# Patient Record
Sex: FEMALE | Race: BLACK OR AFRICAN AMERICAN | NOT HISPANIC OR LATINO | Employment: FULL TIME | URBAN - METROPOLITAN AREA
[De-identification: names, ages, dates, MRNs, and addresses within clinical notes are randomized per-mention and may not be internally consistent; named-entity substitution may affect disease eponyms.]

---

## 2017-03-15 ENCOUNTER — OFFICE VISIT (OUTPATIENT)
Dept: FAMILY MEDICINE | Facility: CLINIC | Age: 37
End: 2017-03-15
Payer: COMMERCIAL

## 2017-03-15 VITALS
DIASTOLIC BLOOD PRESSURE: 82 MMHG | RESPIRATION RATE: 16 BRPM | SYSTOLIC BLOOD PRESSURE: 120 MMHG | WEIGHT: 199.5 LBS | BODY MASS INDEX: 36.71 KG/M2 | HEART RATE: 86 BPM | HEIGHT: 62 IN | OXYGEN SATURATION: 99 % | TEMPERATURE: 98 F

## 2017-03-15 DIAGNOSIS — J30.2 SEASONAL ALLERGIC RHINITIS, UNSPECIFIED ALLERGIC RHINITIS TRIGGER: Primary | ICD-10-CM

## 2017-03-15 PROCEDURE — 1160F RVW MEDS BY RX/DR IN RCRD: CPT | Mod: S$GLB,,, | Performed by: FAMILY MEDICINE

## 2017-03-15 PROCEDURE — 99999 PR PBB SHADOW E&M-EST. PATIENT-LVL III: CPT | Mod: PBBFAC,,, | Performed by: FAMILY MEDICINE

## 2017-03-15 PROCEDURE — 99214 OFFICE O/P EST MOD 30 MIN: CPT | Mod: S$GLB,,, | Performed by: FAMILY MEDICINE

## 2017-03-15 RX ORDER — FLUTICASONE PROPIONATE 50 MCG
1 SPRAY, SUSPENSION (ML) NASAL DAILY
Qty: 16 G | Refills: 5 | Status: SHIPPED | OUTPATIENT
Start: 2017-03-15 | End: 2017-08-21 | Stop reason: SDUPTHER

## 2017-03-15 NOTE — PATIENT INSTRUCTIONS
Allergic Rhinitis  Allergic rhinitis is an allergic reaction that affects the nose, and often the eyes. Its often known as nasal allergies. Nasal allergies are often due to things in the environment that are breathed in. Depending what you are sensitive to, nasal allergies may occur only during certain seasons. Or they may occur year round. Common indoor allergens include house dust mites, mold, cockroaches, and pet dander. Outdoor allergens include pollen from trees, grasses, and weeds.   Symptoms include a drippy, stuffy, and itchy nose. They also include sneezing and red and itchy eyes. You may feel tired more often. Severe allergies may also affect your breathing and trigger a condition called asthma.   Tests can be done to see what allergens are affecting you. You may be referred to an allergy specialist for testing and further evaluation.  Home care  The healthcare provider may prescribe medicines to help relieve allergy symptoms.   Ask the provider for advice on how to avoid substances that you are allergic to. Below are a few tips for each type of allergen.  Pet dander:  · Do not have pets with fur and feathers.  · If you cannot avoid having a pet, keep it out of your bedroom and off upholstered furniture.  Pollen:  · When pollen counts are high, keep windows of your car and home closed. If possible, use an air conditioner instead.  · Wear a filter mask when mowing or doing yard work.  House dust mites:  · Wash bedding every week in warm water and detergent and dry on a hot setting.  · Cover the mattress, box spring, and pillows with allergy covers.   · If possible, sleep in a room with no carpet, curtains, or upholstered furniture.  Cockroaches:  · Store food in sealed containers.  · Remove garbage from the home promptly.  · Fix water leaks  Mold:  · Keep humidity low by using a dehumidifier or air conditioner. Keep the dehumidifier and air conditioner clean and free of mold.  · Clean moldy areas with  bleach and water.  In general:  · Vacuum once or twice a week. If possible, use a vacuum with a high-efficiency particulate air (HEPA) filter.  · Do not smoke. Avoid cigarette smoke. Cigarette smoke is an irritant that can make symptoms worse.  Follow-up care  Follow up as advised by the health care provider or our staff. If you were referred to an allergy specialist, make this appointment promptly.  When to seek medical advice  Call your healthcare provider right away if the following occur:  · Coughing or wheezing  · Fever greater than 100.4°F (38°C)  · Continuing symptoms, new symptoms, or worsening symptoms  Call 911 right away if you have:  · Trouble breathing  · Hives (raised red bumps)  · Severe swelling of the face or severe itching of the eyes or mouth  Date Last Reviewed: 4/26/2015  © 9621-5189 [employer]. [address] [place] [address], [place] [address]. All rights reserved. This information is not intended as a substitute for professional medical care. Always follow your healthcare professional's instructions.

## 2017-03-15 NOTE — MEDICAL/APP STUDENT
"Subjective:       Patient ID: Lizbeth Nguyen is a 37 y.o. female.    Chief Complaint: Sinus Problem (x1 week with sore throat, nasal congestion, cough, and post nasal ; otc benadryl)    HPI Comments: Ms Nguyen comes to the clinic today with a 1 week history of postnasal drip, congestion and rhinorrhea. Last night she developed sore throat. She also has sneezing "fits", itchy watery eyes, and scant mucus (yellow tinged) production. Her symptoms are worse in the morning. She has taken benadryl which helped with sleep.    She denies getting a flu shot this season.    Denies sick contacts.    Similar symptoms to     She has a pertinent history of environmental allergy.    Review of Systems   Constitutional: Negative for chills, fatigue and fever.   HENT: Positive for congestion, postnasal drip, rhinorrhea, sinus pressure, sneezing and sore throat. Negative for ear pain and hearing loss.    Eyes: Positive for discharge and itching.   Respiratory: Positive for cough.    Cardiovascular: Negative for chest pain.   Allergic/Immunologic: Positive for environmental allergies.   Neurological: Negative for headaches.       Objective:      Physical Exam   Constitutional: She is oriented to person, place, and time. She appears well-developed and well-nourished.   HENT:   Head: Normocephalic and atraumatic. Head is with right periorbital erythema and with left periorbital erythema.   Right Ear: Hearing normal. Tympanic membrane is injected. A middle ear effusion is present.   Left Ear: Hearing normal. A middle ear effusion is present.   Nose: Mucosal edema and rhinorrhea present.   Mouth/Throat: Posterior oropharyngeal erythema present.   Eyes: EOM are normal. Pupils are equal, round, and reactive to light. Right eye exhibits discharge. Left eye exhibits discharge.   Neck: Normal range of motion. Neck supple.   Cardiovascular: Normal rate, regular rhythm, normal heart sounds and intact distal pulses.    Pulmonary/Chest: " "Effort normal and breath sounds normal.   Musculoskeletal: Normal range of motion.   Neurological: She is alert and oriented to person, place, and time.   Skin: Skin is warm and dry.   Psychiatric: She has a normal mood and affect. Her behavior is normal.   Vitals reviewed.      Assessment:       1. Allergic rhinitis  Plan:       - Flonase 50mcg each nares, twice daily for allergic symptoms  - Zyrtec 10mg PO once daily x 2 weeks, then PRN for allergic symptoms      Pt has been given instructions populated from [employer] database and has verbalized understanding of the after visit summary and information contained wherein.    Follow up with a primary care provider. May go to ER for acute shortness of breath, lightheadedness, fever, or any other emergent complaints or changes in condition.    "This note will be shared with the patient"    The [employer] medical Dictation software was used during the dictation of portions or the entirety of this medical record.  Phonetic or grammatic errors may exist due to the use of this software. For clarification, refer to the author of the document.          "

## 2017-03-15 NOTE — MR AVS SNAPSHOT
[facility]  [address]  [place] [address]  Phone: [phone]  Fax: [phone]                  Lizbeth Nguyen   3/15/2017 8:40 AM   Office Visit    Description:  Female : 1980   Provider:  Clay Pedroza MD   Department:  [facility]           Reason for Visit     Sinus Problem           Diagnoses this Visit        Comments    Seasonal allergic rhinitis, unspecified allergic rhinitis trigger    -  Primary            To Do List           Goals (5 Years of Data)     None      Follow-Up and Disposition     Return if symptoms worsen or fail to improve.       These Medications        Disp Refills Start End    fluticasone (FLONASE) 50 mcg/actuation nasal spray 16 g 5 3/15/2017     1 spray by Each Nare route once daily. - Each Nare    Pharmacy: [facility] [id number]  [facility] [address] [place] AT [facility] #: [phone]         [facility] On Call     [facility] On Call Nurse Care Line -  Assistance  Registered nurses in the [facility] On Call Center provide clinical advisement, health education, appointment booking, and other advisory services.  Call for this free service at [phone].             Medications           Message regarding Medications     Verify the changes and/or additions to your medication regime listed below are the same as discussed with your clinician today.  If any of these changes or additions are incorrect, please notify your healthcare provider.        STOP taking these medications     loratadine (CLARITIN) 10 mg tablet Take 1 tablet (10 mg total) by mouth once daily.    loratadine (CLARITIN) 10 mg tablet Take 1 tablet (10 mg total) by mouth once daily.           Verify that the below list of medications is an accurate representation of the medications you are currently taking.  If none reported, the list may be blank. If incorrect, please contact your healthcare provider. Carry this list with  "you in case of emergency.           Current Medications     fluticasone (FLONASE) 50 mcg/actuation nasal spray 1 spray by Each Nare route once daily.    ibuprofen (ADVIL) 200 MG tablet Take 200 mg by mouth every 6 (six) hours as needed for Pain.           Clinical Reference Information           Your Vitals Were     BP Pulse Temp Resp Height Weight    120/82 (BP Location: Right arm, Patient Position: Sitting, BP Method: Manual) 86 98.2 °F (36.8 °C) (Oral) 16 5' 2" (1.575 m) 90.5 kg (199 lb 8.3 oz)    Last Period SpO2 BMI          02/19/2017 (Exact Date) 99% 36.49 kg/m2        Blood Pressure          Most Recent Value    BP  120/82      Allergies as of 3/15/2017     No Known Allergies      Immunizations Administered on Date of Encounter - 3/15/2017     None      Instructions      Allergic Rhinitis  Allergic rhinitis is an allergic reaction that affects the nose, and often the eyes. Its often known as nasal allergies. Nasal allergies are often due to things in the environment that are breathed in. Depending what you are sensitive to, nasal allergies may occur only during certain seasons. Or they may occur year round. Common indoor allergens include house dust mites, mold, cockroaches, and pet dander. Outdoor allergens include pollen from trees, grasses, and weeds.   Symptoms include a drippy, stuffy, and itchy nose. They also include sneezing and red and itchy eyes. You may feel tired more often. Severe allergies may also affect your breathing and trigger a condition called asthma.   Tests can be done to see what allergens are affecting you. You may be referred to an allergy specialist for testing and further evaluation.  Home care  The healthcare provider may prescribe medicines to help relieve allergy symptoms.   Ask the provider for advice on how to avoid substances that you are allergic to. Below are a few tips for each type of allergen.  Pet dander:  · Do not have pets with fur and feathers.  · If you cannot " avoid having a pet, keep it out of your bedroom and off upholstered furniture.  Pollen:  · When pollen counts are high, keep windows of your car and home closed. If possible, use an air conditioner instead.  · Wear a filter mask when mowing or doing yard work.  House dust mites:  · Wash bedding every week in warm water and detergent and dry on a hot setting.  · Cover the mattress, box spring, and pillows with allergy covers.   · If possible, sleep in a room with no carpet, curtains, or upholstered furniture.  Cockroaches:  · Store food in sealed containers.  · Remove garbage from the home promptly.  · Fix water leaks  Mold:  · Keep humidity low by using a dehumidifier or air conditioner. Keep the dehumidifier and air conditioner clean and free of mold.  · Clean moldy areas with bleach and water.  In general:  · Vacuum once or twice a week. If possible, use a vacuum with a high-efficiency particulate air (HEPA) filter.  · Do not smoke. Avoid cigarette smoke. Cigarette smoke is an irritant that can make symptoms worse.  Follow-up care  Follow up as advised by the health care provider or our staff. If you were referred to an allergy specialist, make this appointment promptly.  When to seek medical advice  Call your healthcare provider right away if the following occur:  · Coughing or wheezing  · Fever greater than 100.4°F (38°C)  · Continuing symptoms, new symptoms, or worsening symptoms  Call 911 right away if you have:  · Trouble breathing  · Hives (raised red bumps)  · Severe swelling of the face or severe itching of the eyes or mouth  Date Last Reviewed: 4/26/2015  © 2109-5376 [employer]. [address] [place] [address], [place] [address]. All rights reserved. This information is not intended as a substitute for professional medical care. Always follow your healthcare professional's instructions.             Language Assistance Services     ATTENTION: Language assistance services are available, free of  charge. Please call [phone].      ATENCIÓN: Si habla español, tiene a sanchez disposición servicios gratuitos de asistencia lingüística. Llame al [phone].     CHÚ Ý: N?u b?n nói Ti?ng Vi?t, có các d?ch v? h? tr? ngôn ng? mi?n phí dành cho b?n. G?i s? [phone].         [facility] complies with applicable Federal civil rights laws and does not discriminate on the basis of race, color, national origin, age, disability, or sex.

## 2017-03-15 NOTE — PROGRESS NOTES
Subjective:       Patient ID: Lizbeth Nguyen is a 37 y.o. female.    Chief Complaint: Sinus Problem (x1 week with sore throat, nasal congestion, cough, and post nasal ; otc benadryl)    Sinus Problem   This is a new problem. The current episode started in the past 7 days. The problem has been gradually worsening since onset. There has been no fever. Associated symptoms include congestion and coughing. Pertinent negatives include no chills. (PND) Past treatments include oral decongestants. The treatment provided no relief.     No sick contacts    Current Outpatient Prescriptions on File Prior to Visit   Medication Sig Dispense Refill    ibuprofen (ADVIL) 200 MG tablet Take 200 mg by mouth every 6 (six) hours as needed for Pain.      [DISCONTINUED] fluticasone (FLONASE) 50 mcg/actuation nasal spray 1 spray by Each Nare route once daily. 16 g 1    [DISCONTINUED] loratadine (CLARITIN) 10 mg tablet Take 1 tablet (10 mg total) by mouth once daily. 30 tablet 1    [DISCONTINUED] loratadine (CLARITIN) 10 mg tablet Take 1 tablet (10 mg total) by mouth once daily. 30 tablet 1     No current facility-administered medications on file prior to visit.        Review of Systems   Constitutional: Negative for chills and fever.   HENT: Positive for congestion.    Respiratory: Positive for cough.    Gastrointestinal: Negative for diarrhea, nausea and vomiting.       Objective:     Vitals:    03/15/17 0844   BP: 120/82   Pulse: 86   Resp: 16   Temp: 98.2 °F (36.8 °C)        Physical Exam   Constitutional: She is oriented to person, place, and time. She appears well-developed and well-nourished. No distress.   HENT:   Head: Normocephalic and atraumatic.   Right Ear: External ear normal. Tympanic membrane is not injected and not bulging. A middle ear effusion is present.   Left Ear: External ear normal. Tympanic membrane is not injected and not bulging.  No middle ear effusion.   Nose: Mucosal edema and rhinorrhea present. No  nose lacerations or nasal deformity.   Mouth/Throat: Posterior oropharyngeal erythema present. No oropharyngeal exudate, posterior oropharyngeal edema or tonsillar abscesses.   PND   Eyes: Conjunctivae and EOM are normal. Right eye exhibits no discharge. Left eye exhibits no discharge. No scleral icterus.   Neck: No tracheal deviation present. No thyromegaly present.   Cardiovascular: Normal rate and regular rhythm.  Exam reveals no gallop and no friction rub.    No murmur heard.  Pulmonary/Chest: Effort normal and breath sounds normal. No respiratory distress. She has no wheezes. She has no rales.   Lymphadenopathy:     She has no cervical adenopathy.   Neurological: She is oriented to person, place, and time.   Skin: She is not diaphoretic.   Psychiatric: She has a normal mood and affect.   Vitals reviewed.      Assessment:       1. Seasonal allergic rhinitis, unspecified allergic rhinitis trigger        Plan:       Lizbeth was seen today for sinus problem.    Diagnoses and all orders for this visit:    Seasonal allergic rhinitis, unspecified allergic rhinitis trigger  -     fluticasone (FLONASE) 50 mcg/actuation nasal spray; 1 spray by Each Nare route once daily.    NEW - Pts sxs and PE most coincide with AR. Will give pt a trial of flonase and daily zyrtec for relief of pts sxs. Pt asked to allow 2 weeks of consistent use before evaluating the efficacy of flonase. Pt to call back if sxs worsen or do not improve in 2 weeks.           Return if symptoms worsen or fail to improve.        Pt verbalized understanding and agreed with our plan.

## 2017-08-17 ENCOUNTER — OFFICE VISIT (OUTPATIENT)
Dept: FAMILY MEDICINE | Facility: CLINIC | Age: 37
End: 2017-08-17
Payer: COMMERCIAL

## 2017-08-17 VITALS
RESPIRATION RATE: 17 BRPM | HEIGHT: 62 IN | BODY MASS INDEX: 39.27 KG/M2 | DIASTOLIC BLOOD PRESSURE: 84 MMHG | WEIGHT: 213.38 LBS | HEART RATE: 98 BPM | OXYGEN SATURATION: 97 % | TEMPERATURE: 99 F | SYSTOLIC BLOOD PRESSURE: 104 MMHG

## 2017-08-17 DIAGNOSIS — K59.00 CONSTIPATION, UNSPECIFIED CONSTIPATION TYPE: Primary | ICD-10-CM

## 2017-08-17 PROCEDURE — 99999 PR PBB SHADOW E&M-EST. PATIENT-LVL III: CPT | Mod: PBBFAC,,, | Performed by: FAMILY MEDICINE

## 2017-08-17 PROCEDURE — 99214 OFFICE O/P EST MOD 30 MIN: CPT | Mod: S$GLB,,, | Performed by: FAMILY MEDICINE

## 2017-08-17 PROCEDURE — 3008F BODY MASS INDEX DOCD: CPT | Mod: S$GLB,,, | Performed by: FAMILY MEDICINE

## 2017-08-17 RX ORDER — LACTULOSE 10 G/15ML
20 SOLUTION ORAL DAILY PRN
Qty: 300 ML | Refills: 0 | Status: SHIPPED | OUTPATIENT
Start: 2017-08-17 | End: 2017-08-27

## 2017-08-17 NOTE — LETTER
August 17, 2017      Lizbeth Nguyen   [address] [facility] [address]             [facility] [address] [place] [address]  Phone: [phone]  Fax: [phone] Lizbeth Nguyen    Was treated here on 08/17/2017    May Return to work/school on 08/21/2017.    No Restrictions              Manuel Ibarra MD

## 2017-08-17 NOTE — PROGRESS NOTES
Chief Complaint   Patient presents with    Abdominal Pain     on lt side upper abdomen stared with pain on monday        HPI  Lizbeth Nguyen is a 37 y.o. female with multiple medical diagnoses as listed in the medical history and problem list that presents for upper abdomen.      Upper abdominal pain - mid epigastric, no BM 4 days, no fever, no nausea, no vomiting, no changes with food, pain with movement, no rash, meds: enema only with mild relief. No other meds.     Pt is known to me and was last seen by me on 12/22/2016.    PAST MEDICAL HISTORY:  History reviewed. No pertinent past medical history.    PAST SURGICAL HISTORY:  History reviewed. No pertinent surgical history.    SOCIAL HISTORY:  Social History     Social History    Marital status: Single     Spouse name: N/A    Number of children: N/A    Years of education: N/A     Occupational History    Not on file.     Social History Main Topics    Smoking status: Never Smoker    Smokeless tobacco: Not on file    Alcohol use Yes    Drug use: No    Sexual activity: Not on file     Other Topics Concern    Not on file     Social History Narrative    No narrative on file       FAMILY HISTORY:  Family History   Problem Relation Age of Onset    Hypertension Father        ALLERGIES AND MEDICATIONS: updated and reviewed.  Review of patient's allergies indicates:  No Known Allergies  Current Outpatient Prescriptions   Medication Sig Dispense Refill    fluticasone (FLONASE) 50 mcg/actuation nasal spray 1 spray by Each Nare route once daily. 16 g 5    ibuprofen (ADVIL) 200 MG tablet Take 200 mg by mouth every 6 (six) hours as needed for Pain.      lactulose (CHRONULAC) 20 gram/30 mL Soln Take 30 mLs (20 g total) by mouth daily as needed. 300 mL 0     No current facility-administered medications for this visit.        ROS  Review of Systems   Constitutional: Negative for activity change, appetite change and fever.   HENT: Negative for congestion and  "sore throat.    Eyes: Negative for visual disturbance.   Respiratory: Negative for cough and shortness of breath.    Cardiovascular: Negative for chest pain.   Gastrointestinal: Positive for abdominal pain. Negative for diarrhea, nausea and vomiting.   Endocrine: Negative.    Genitourinary: Negative for dysuria.   Musculoskeletal: Negative for arthralgias and back pain.   Skin: Negative for rash.   Allergic/Immunologic: Negative.    Neurological: Negative for dizziness, weakness and headaches.   Hematological: Negative.    Psychiatric/Behavioral: Negative for agitation and confusion.       Physical Exam  Vitals:    08/17/17 1525   BP: 104/84   Pulse: 98   Resp: 17   Temp: 98.7 °F (37.1 °C)    Body mass index is 39.03 kg/m².  Weight: 96.8 kg (213 lb 6.5 oz)   Height: 5' 2" (157.5 cm)     Physical Exam   Constitutional: She is oriented to person, place, and time. She appears well-developed and well-nourished.   HENT:   Head: Normocephalic and atraumatic.   Eyes: Conjunctivae and EOM are normal. Pupils are equal, round, and reactive to light.   Neck: Normal range of motion. Neck supple.   Cardiovascular: Normal rate, regular rhythm and normal heart sounds.    Pulmonary/Chest: Effort normal and breath sounds normal.   Abdominal: Soft. Bowel sounds are normal.   Musculoskeletal: Normal range of motion.   Mid epigatric TTP, no R/G   Neurological: She is alert and oriented to person, place, and time. She has normal reflexes.   Skin: Skin is warm and dry.   Psychiatric: She has a normal mood and affect. Her behavior is normal. Judgment and thought content normal.       Health Maintenance       Date Due Completion Date    TETANUS VACCINE 02/28/1999 ---    Pap Smear with HPV Cotest 08/14/2016 8/14/2013 (Done)    Override on 8/14/2013: Done    Influenza Vaccine 08/01/2017 ---          Assessment & Plan    Constipation, unspecified constipation type. 1st episode  - Increase hydration, fiber  - Lactulose prn prog worsening " constipation  - Encouraged exercise   - Monitor symptoms closely        Return in about 4 weeks (around 9/14/2017).

## 2017-08-18 ENCOUNTER — LAB VISIT (OUTPATIENT)
Dept: LAB | Facility: HOSPITAL | Age: 37
End: 2017-08-18
Attending: FAMILY MEDICINE
Payer: COMMERCIAL

## 2017-08-18 DIAGNOSIS — K59.00 CONSTIPATION, UNSPECIFIED CONSTIPATION TYPE: ICD-10-CM

## 2017-08-18 LAB
ALBUMIN SERPL BCP-MCNC: 3.4 G/DL
ALP SERPL-CCNC: 58 U/L
ALT SERPL W/O P-5'-P-CCNC: 16 U/L
ANION GAP SERPL CALC-SCNC: 8 MMOL/L
AST SERPL-CCNC: 19 U/L
BASOPHILS # BLD AUTO: 0.03 K/UL
BASOPHILS NFR BLD: 0.5 %
BILIRUB SERPL-MCNC: 0.5 MG/DL
BUN SERPL-MCNC: 13 MG/DL
CALCIUM SERPL-MCNC: 9.1 MG/DL
CHLORIDE SERPL-SCNC: 103 MMOL/L
CHOLEST/HDLC SERPL: 3.8 {RATIO}
CO2 SERPL-SCNC: 25 MMOL/L
CREAT SERPL-MCNC: 1 MG/DL
DIFFERENTIAL METHOD: NORMAL
EOSINOPHIL # BLD AUTO: 0.3 K/UL
EOSINOPHIL NFR BLD: 5.5 %
ERYTHROCYTE [DISTWIDTH] IN BLOOD BY AUTOMATED COUNT: 13.3 %
EST. GFR  (AFRICAN AMERICAN): >60 ML/MIN/1.73 M^2
EST. GFR  (NON AFRICAN AMERICAN): >60 ML/MIN/1.73 M^2
GLUCOSE SERPL-MCNC: 84 MG/DL
HCT VFR BLD AUTO: 38.6 %
HDL/CHOLESTEROL RATIO: 26.3 %
HDLC SERPL-MCNC: 167 MG/DL
HDLC SERPL-MCNC: 44 MG/DL
HGB BLD-MCNC: 13 G/DL
LDLC SERPL CALC-MCNC: 111 MG/DL
LYMPHOCYTES # BLD AUTO: 2.6 K/UL
LYMPHOCYTES NFR BLD: 43 %
MCH RBC QN AUTO: 30.4 PG
MCHC RBC AUTO-ENTMCNC: 33.7 G/DL
MCV RBC AUTO: 90 FL
MONOCYTES # BLD AUTO: 0.4 K/UL
MONOCYTES NFR BLD: 5.8 %
NEUTROPHILS # BLD AUTO: 2.7 K/UL
NEUTROPHILS NFR BLD: 45 %
NONHDLC SERPL-MCNC: 123 MG/DL
PLATELET # BLD AUTO: 253 K/UL
PMV BLD AUTO: 10.7 FL
POTASSIUM SERPL-SCNC: 4.7 MMOL/L
PROT SERPL-MCNC: 6.9 G/DL
RBC # BLD AUTO: 4.27 M/UL
SODIUM SERPL-SCNC: 136 MMOL/L
T4 FREE SERPL-MCNC: 1.17 NG/DL
TRIGL SERPL-MCNC: 60 MG/DL
TSH SERPL DL<=0.005 MIU/L-ACNC: 1.36 UIU/ML
WBC # BLD AUTO: 6 K/UL

## 2017-08-18 PROCEDURE — 85025 COMPLETE CBC W/AUTO DIFF WBC: CPT

## 2017-08-18 PROCEDURE — 84439 ASSAY OF FREE THYROXINE: CPT

## 2017-08-18 PROCEDURE — 80053 COMPREHEN METABOLIC PANEL: CPT

## 2017-08-18 PROCEDURE — 36415 COLL VENOUS BLD VENIPUNCTURE: CPT | Mod: PO

## 2017-08-18 PROCEDURE — 80061 LIPID PANEL: CPT

## 2017-08-18 PROCEDURE — 84443 ASSAY THYROID STIM HORMONE: CPT

## 2017-08-21 RX ORDER — FLUTICASONE PROPIONATE 50 MCG
1 SPRAY, SUSPENSION (ML) NASAL DAILY
Qty: 16 G | Refills: 5 | OUTPATIENT
Start: 2017-08-21 | End: 2021-04-16

## 2018-03-27 ENCOUNTER — OFFICE VISIT (OUTPATIENT)
Dept: FAMILY MEDICINE | Facility: CLINIC | Age: 38
End: 2018-03-27
Payer: COMMERCIAL

## 2018-03-27 VITALS
HEART RATE: 104 BPM | TEMPERATURE: 98 F | DIASTOLIC BLOOD PRESSURE: 80 MMHG | BODY MASS INDEX: 38.54 KG/M2 | HEIGHT: 62 IN | WEIGHT: 209.44 LBS | SYSTOLIC BLOOD PRESSURE: 108 MMHG | RESPIRATION RATE: 16 BRPM | OXYGEN SATURATION: 95 %

## 2018-03-27 DIAGNOSIS — K52.9 AGE (ACUTE GASTROENTERITIS): Primary | ICD-10-CM

## 2018-03-27 PROCEDURE — 99999 PR PBB SHADOW E&M-EST. PATIENT-LVL III: CPT | Mod: PBBFAC,,, | Performed by: FAMILY MEDICINE

## 2018-03-27 PROCEDURE — 99214 OFFICE O/P EST MOD 30 MIN: CPT | Mod: S$GLB,,, | Performed by: FAMILY MEDICINE

## 2018-03-27 RX ORDER — LOPERAMIDE HYDROCHLORIDE 2 MG/1
2 CAPSULE ORAL 3 TIMES DAILY PRN
Qty: 40 CAPSULE | Refills: 1 | Status: SHIPPED | OUTPATIENT
Start: 2018-03-27 | End: 2018-04-06

## 2018-03-27 RX ORDER — LEVOCETIRIZINE DIHYDROCHLORIDE 5 MG/1
5 TABLET, FILM COATED ORAL NIGHTLY
COMMUNITY
End: 2021-04-16 | Stop reason: ALTCHOICE

## 2018-03-27 NOTE — LETTER
March 27, 2018      [facility] [address]  [place] [address]  Phone: [phone]  Fax: [phone]       Patient: Lizbeth Nguyen   YOB: 1980  Date of Visit: 03/27/2018    To Whom It May Concern:    Aisha Nguyen  was at [facility] on 03/27/2018 although symptoms were present on 3/26/2018.    She may return to work/school on 3/28/2018 with no restrictions. If you have any questions or concerns, or if I can be of further assistance, please do not hesitate to contact me.    Sincerely,          Manuel Ibarra MD

## 2018-03-27 NOTE — PROGRESS NOTES
Chief Complaint   Patient presents with    Diarrhea     for 2 days       HPI  Lizbeth Nguyen is a 38 y.o. female with multiple medical diagnoses as listed in the medical history and problem list that presents for diarrhea.    Diarrhea - also with nausea/emesis 2 days ago, overall 2 day hx, +watery, hydrating well, +water/gatorade, slowly improved appetite.      LMP - 3/5/2018.    Pt is known to me and was last seen by me on 8/17/2017.    PAST MEDICAL HISTORY:  History reviewed. No pertinent past medical history.    PAST SURGICAL HISTORY:  History reviewed. No pertinent surgical history.    SOCIAL HISTORY:  Social History     Social History    Marital status: Single     Spouse name: N/A    Number of children: N/A    Years of education: N/A     Occupational History    Not on file.     Social History Main Topics    Smoking status: Never Smoker    Smokeless tobacco: Never Used    Alcohol use Yes    Drug use: No    Sexual activity: Not on file     Other Topics Concern    Not on file     Social History Narrative    No narrative on file       FAMILY HISTORY:  Family History   Problem Relation Age of Onset    Hypertension Father        ALLERGIES AND MEDICATIONS: updated and reviewed.  Review of patient's allergies indicates:  No Known Allergies  Current Outpatient Prescriptions   Medication Sig Dispense Refill    fluticasone (FLONASE) 50 mcg/actuation nasal spray 1 spray by Each Nare route once daily. 16 g 5    ibuprofen (ADVIL) 200 MG tablet Take 200 mg by mouth every 6 (six) hours as needed for Pain.      levocetirizine (XYZAL) 5 MG tablet Take 5 mg by mouth every evening.      loperamide (IMODIUM) 2 mg capsule Take 1 capsule (2 mg total) by mouth 3 (three) times daily as needed for Diarrhea. 40 capsule 1     No current facility-administered medications for this visit.        ROS  Review of Systems   Constitutional: Positive for fatigue. Negative for activity change, appetite change and fever.  "  HENT: Negative for congestion and sore throat.    Eyes: Negative for visual disturbance.   Respiratory: Negative for cough and shortness of breath.    Cardiovascular: Negative for chest pain.   Gastrointestinal: Positive for diarrhea, nausea and vomiting. Negative for abdominal pain.   Endocrine: Negative.    Genitourinary: Negative for dysuria.   Musculoskeletal: Negative for arthralgias and back pain.   Skin: Negative for rash.   Allergic/Immunologic: Negative.    Neurological: Negative for dizziness, weakness and headaches.   Hematological: Negative.    Psychiatric/Behavioral: Negative for agitation and confusion.       Physical Exam  Vitals:    03/27/18 1616   BP: 108/80   Pulse: 104   Resp: 16   Temp: 98.3 °F (36.8 °C)    Body mass index is 38.31 kg/m².  Weight: 95 kg (209 lb 7 oz)   Height: 5' 2" (157.5 cm)     Physical Exam   Constitutional: She is oriented to person, place, and time. She appears well-developed and well-nourished.   HENT:   Head: Normocephalic.   Neurological: She is alert and oriented to person, place, and time.   Psychiatric: She has a normal mood and affect. Her behavior is normal. Judgment and thought content normal.       Health Maintenance       Date Due Completion Date    Influenza Vaccine 08/01/2017 10/24/2014    Pap Smear with HPV Cotest 09/28/2020 9/28/2017    Override on 8/14/2013: Done    TETANUS VACCINE 10/14/2027 10/14/2017          Assessment & Plan    AGE (acute gastroenteritis)  -     loperamide (IMODIUM) 2 mg capsule; Take 1 capsule (2 mg total) by mouth 3 (three) times daily as needed for Diarrhea.  Dispense: 40 capsule; Refill: 1  - Overall improving  - Discussed dietary restrictions        Follow-up in about 4 weeks (around 4/24/2018).          "

## 2018-05-02 ENCOUNTER — OFFICE VISIT (OUTPATIENT)
Dept: FAMILY MEDICINE | Facility: CLINIC | Age: 38
End: 2018-05-02
Payer: COMMERCIAL

## 2018-05-02 VITALS
WEIGHT: 214.94 LBS | HEART RATE: 110 BPM | SYSTOLIC BLOOD PRESSURE: 124 MMHG | DIASTOLIC BLOOD PRESSURE: 70 MMHG | TEMPERATURE: 98 F | HEIGHT: 62 IN | RESPIRATION RATE: 20 BRPM | OXYGEN SATURATION: 96 % | BODY MASS INDEX: 39.56 KG/M2

## 2018-05-02 DIAGNOSIS — R31.9 HEMATURIA, UNSPECIFIED TYPE: Primary | ICD-10-CM

## 2018-05-02 DIAGNOSIS — R10.2 ACUTE PELVIC PAIN, FEMALE: ICD-10-CM

## 2018-05-02 DIAGNOSIS — G89.29 CHRONIC BILATERAL LOW BACK PAIN, WITH SCIATICA PRESENCE UNSPECIFIED: ICD-10-CM

## 2018-05-02 DIAGNOSIS — M54.5 CHRONIC BILATERAL LOW BACK PAIN, WITH SCIATICA PRESENCE UNSPECIFIED: ICD-10-CM

## 2018-05-02 DIAGNOSIS — N39.0 URINARY TRACT INFECTION WITHOUT HEMATURIA, SITE UNSPECIFIED: ICD-10-CM

## 2018-05-02 LAB
BILIRUB SERPL-MCNC: ABNORMAL MG/DL
BLOOD URINE, POC: ABNORMAL
COLOR, POC UA: ABNORMAL
GLUCOSE UR QL STRIP: NORMAL
KETONES UR QL STRIP: NEGATIVE
LEUKOCYTE ESTERASE URINE, POC: ABNORMAL
NITRITE, POC UA: NEGATIVE
PH, POC UA: 7
PROTEIN, POC: ABNORMAL
SPECIFIC GRAVITY, POC UA: 1.01
UROBILINOGEN, POC UA: NORMAL

## 2018-05-02 PROCEDURE — 99999 PR PBB SHADOW E&M-EST. PATIENT-LVL III: CPT | Mod: PBBFAC,,, | Performed by: FAMILY MEDICINE

## 2018-05-02 PROCEDURE — 81001 URINALYSIS AUTO W/SCOPE: CPT | Mod: S$GLB,,, | Performed by: FAMILY MEDICINE

## 2018-05-02 PROCEDURE — 87086 URINE CULTURE/COLONY COUNT: CPT

## 2018-05-02 PROCEDURE — 99214 OFFICE O/P EST MOD 30 MIN: CPT | Mod: 25,S$GLB,, | Performed by: FAMILY MEDICINE

## 2018-05-02 RX ORDER — CIPROFLOXACIN 500 MG/1
500 TABLET ORAL 2 TIMES DAILY
Qty: 14 TABLET | Refills: 0 | Status: SHIPPED | OUTPATIENT
Start: 2018-05-02 | End: 2018-05-09

## 2018-05-02 NOTE — PROGRESS NOTES
Chief Complaint   Patient presents with    Hematuria    Pelvic Pain       HPI  Lizbeth Nguyen is a 38 y.o. female with multiple medical diagnoses as listed in the medical history and problem list that presents for pelvic pain.    Pelvic pain, hematuria - 1 week hx, after menses completion states hematuria 2 days afterward, +inguinal pain, +malodorous urine, increased hydration, +hematuria resolved. Overall symptoms have improved.      Chronic back pain - presented to Ortho a few years ago, recommended PT, unable to complete.     Pt is known to me and was last seen by me on 3/27/2018.    PAST MEDICAL HISTORY:  History reviewed. No pertinent past medical history.    PAST SURGICAL HISTORY:  History reviewed. No pertinent surgical history.    SOCIAL HISTORY:  Social History     Social History    Marital status: Single     Spouse name: N/A    Number of children: N/A    Years of education: N/A     Occupational History    Not on file.     Social History Main Topics    Smoking status: Never Smoker    Smokeless tobacco: Never Used    Alcohol use Yes    Drug use: No    Sexual activity: Not on file     Other Topics Concern    Not on file     Social History Narrative    No narrative on file       FAMILY HISTORY:  Family History   Problem Relation Age of Onset    Hypertension Father        ALLERGIES AND MEDICATIONS: updated and reviewed.  Review of patient's allergies indicates:  No Known Allergies  Current Outpatient Prescriptions   Medication Sig Dispense Refill    fluticasone (FLONASE) 50 mcg/actuation nasal spray 1 spray by Each Nare route once daily. 16 g 5    ibuprofen (ADVIL) 200 MG tablet Take 200 mg by mouth every 6 (six) hours as needed for Pain.      levocetirizine (XYZAL) 5 MG tablet Take 5 mg by mouth every evening.       No current facility-administered medications for this visit.        ROS  Review of Systems   Constitutional: Negative for activity change, appetite change and fever.   HENT:  "Negative for congestion and sore throat.    Eyes: Negative for visual disturbance.   Respiratory: Negative for cough and shortness of breath.    Cardiovascular: Negative for chest pain.   Gastrointestinal: Negative for abdominal pain, diarrhea, nausea and vomiting.   Endocrine: Negative.    Genitourinary: Positive for hematuria. Negative for dysuria.   Musculoskeletal: Negative for arthralgias and back pain.   Skin: Negative for rash.   Allergic/Immunologic: Negative.    Neurological: Negative for dizziness, weakness and headaches.   Hematological: Negative.    Psychiatric/Behavioral: Negative for agitation and confusion.       Physical Exam  Vitals:    05/02/18 1125   BP: 124/70   Pulse: 110   Resp: 20   Temp: 98.2 °F (36.8 °C)    Body mass index is 39.31 kg/m².  Weight: 97.5 kg (214 lb 15.2 oz)   Height: 5' 2" (157.5 cm)     Physical Exam   Constitutional: She is oriented to person, place, and time. She appears well-developed and well-nourished.   HENT:   Head: Normocephalic.   Neurological: She is alert and oriented to person, place, and time.   Psychiatric: She has a normal mood and affect. Her behavior is normal. Judgment and thought content normal.       Health Maintenance       Date Due Completion Date    Influenza Vaccine 08/01/2018 10/3/2017 (Done)    Override on 10/3/2017: Done    Pap Smear with HPV Cotest 09/28/2020 9/28/2017    Override on 8/14/2013: Done    TETANUS VACCINE 10/14/2027 10/14/2017          Assessment & Plan    Hematuria, unspecified type  -     POCT URINE DIPSTICK WITH MICROSCOPE, AUTOMATED    Acute pelvic pain, female  -     POCT URINE DIPSTICK WITH MICROSCOPE, AUTOMATED    Chronic bilateral low back pain, with sciatica presence unspecified  -     Ambulatory Referral to Physical/Occupational Therapy        No Follow-up on file.          "

## 2018-05-02 NOTE — LETTER
May 2, 2018      [facility] [address]  [place] [address]  Phone: [phone]  Fax: [phone]       Patient: Lizbeth Nguyen   YOB: 1980  Date of Visit: 05/02/2018    To Whom It May Concern:    Aisha Nguyen  was at [facility] on 05/02/2018. She may return to work/school on 5/2/2018 without restrictions. If you have any questions or concerns, or if I can be of further assistance, please do not hesitate to contact me.    Sincerely,          Manuel Ibarra MD

## 2018-05-04 LAB
BACTERIA UR CULT: NORMAL
BACTERIA UR CULT: NORMAL

## 2018-05-15 ENCOUNTER — CLINICAL SUPPORT (OUTPATIENT)
Dept: REHABILITATION | Facility: HOSPITAL | Age: 38
End: 2018-05-15
Attending: FAMILY MEDICINE
Payer: COMMERCIAL

## 2018-05-15 DIAGNOSIS — G89.29 CHRONIC RIGHT-SIDED LOW BACK PAIN WITHOUT SCIATICA: ICD-10-CM

## 2018-05-15 DIAGNOSIS — M54.50 CHRONIC RIGHT-SIDED LOW BACK PAIN WITHOUT SCIATICA: ICD-10-CM

## 2018-05-15 DIAGNOSIS — M62.81 WEAKNESS OF TRUNK MUSCULATURE: ICD-10-CM

## 2018-05-15 PROCEDURE — 97110 THERAPEUTIC EXERCISES: CPT | Mod: PN

## 2018-05-15 PROCEDURE — 97161 PT EVAL LOW COMPLEX 20 MIN: CPT | Mod: PN
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
## 2018-05-22 ENCOUNTER — CLINICAL SUPPORT (OUTPATIENT)
Dept: REHABILITATION | Facility: HOSPITAL | Age: 38
End: 2018-05-22
Attending: FAMILY MEDICINE
Payer: COMMERCIAL

## 2018-05-22 DIAGNOSIS — M54.50 CHRONIC RIGHT-SIDED LOW BACK PAIN WITHOUT SCIATICA: Primary | ICD-10-CM

## 2018-05-22 DIAGNOSIS — G89.29 CHRONIC RIGHT-SIDED LOW BACK PAIN WITHOUT SCIATICA: Primary | ICD-10-CM

## 2018-05-22 DIAGNOSIS — M62.81 WEAKNESS OF TRUNK MUSCULATURE: ICD-10-CM

## 2018-05-22 PROCEDURE — 97110 THERAPEUTIC EXERCISES: CPT | Mod: PN

## 2018-05-22 NOTE — PROGRESS NOTES
Physical Therapy Daily Note     Name: Lizbeth Nguyen  Clinic Number: [id number]  Diagnosis:   Encounter Diagnoses   Name Primary?    Chronic right-sided low back pain without sciatica Yes    Weakness of trunk musculature      Physician: Manuel Ibarra MD  Precautions: standard   Visit #: 2 of 20  PTA Visit #: 1    Time In: 1330  Time Out: 1435  Total Treatment Time: 65 minutes (1:1 with PTA 40 minutes of treatment session)     Subjective     Pt reports: Lizbeth states she felt her lower back was more sore following her initial evaluation. Patient states she isn't sure how her HEP is going because she hasn't had time to do it yet. Patient states the right side of her lower back is sore today.   Pain Scale: Lizbeth rates pain on a scale of 0-10 to be 2 currently.    Objective     Lizbeth received individual therapeutic exercises to develop strength, endurance, ROM, flexibility, posture and core stabilization for 45 minutes including:  Nu step x 7 minutes   SKTC 10 sec x 5   Supine piriformis stretch 20 sec x 3   Supine hip flexor stretch 20 sec x 3   LTR on physioball x 2 minutes  Supine hip adduction ball squeeze x 2 minutes   Posterior pelvic tilt x 2 minutes, 3 sec hold  Supine bridging 2x10  SL clamshells 2x10 ea (RTB)  Open book 1x10 ea   Seated ball roll outs x 3 directions x 3 minutes     Lizbeth received the following manual therapy techniques: Soft tissue Mobilization were applied to the: right hip for 10 minutes including:  IASTM with rolling stick to right QL, glut and IT band    Lizbeth received moist heat to lumbar spine x 10 minutes post treatment session. (supine, bolster under knees)    Written Home Exercises Provided: Reviewed HEP provided during initial evaluation.   Pt demo good understanding of the education provided. Lizbeth demonstrated good return demonstration of activities.     Education provided re:Lizbeth verbalized good  understanding of education provided.   No spiritual or educational barriers to learning provided    Assessment     Lizbeth tolerated treatment well today. Patient tender with palpation to right QL, glut, and IT band with good response to IASTM with rolling pin noted. Patient demonstrates good tolerance to initiation of hip stabilization exercises today with appropriate training effect achieved. Patient with very good response to skilled care with no complaints of lower back pain noted during today's treatment session.   This is a 38 y.o. female referred to outpatient physical therapy and presents with a medical diagnosis of lower back pain and demonstrates limitations as described in the problem list. Pt prognosis is Good. Pt will continue to benefit from skilled outpatient physical therapy to address the deficits listed in the problem list, provide pt/family education and to maximize pt's level of independence in the home and community environment.     Goals as follows:  GOALS: Short Term Goals:  2-3 weeks  1. Report decreased in pain at worse less than  <   / =  1 /10  to increase tolerance for functional activities  2. Pt to increase B Ely's Test by greater than  > or = 5 degrees in order to improve flexibility and posture.   3. Pt to tolerate HEP to improve ROM and independence with ADL's      Long Term Goals: 6-8 weeks  1. Report decreased in pain at worse less than  <   / =  0  /10  to increase tolerance for functional mobility.   2. Pt will report at 40% or less limitation on FOTO lumbar spine survey  to demonstrate decrease in disability and improvement in back pain.  3. Pt to be Independent with HEP to improve ROM and independence with ADL's  4. Pt to increase B Ely's Test by greater than > or = 10 degrees in order to improve flexibility and posture.   5. Pt to demonstrate negative Bridge Test in order to show improved core strength for lumbar stabilization     Plan     Continue with established Plan of  Care towards PT goals.    Therapist: Lizz Schwarz, PTA  5/22/2018

## 2018-05-25 ENCOUNTER — CLINICAL SUPPORT (OUTPATIENT)
Dept: REHABILITATION | Facility: HOSPITAL | Age: 38
End: 2018-05-25
Attending: FAMILY MEDICINE
Payer: COMMERCIAL

## 2018-05-25 DIAGNOSIS — M62.81 WEAKNESS OF TRUNK MUSCULATURE: ICD-10-CM

## 2018-05-25 DIAGNOSIS — M54.50 CHRONIC RIGHT-SIDED LOW BACK PAIN WITHOUT SCIATICA: Primary | ICD-10-CM

## 2018-05-25 DIAGNOSIS — G89.29 CHRONIC RIGHT-SIDED LOW BACK PAIN WITHOUT SCIATICA: Primary | ICD-10-CM

## 2018-05-25 PROCEDURE — 97110 THERAPEUTIC EXERCISES: CPT | Mod: PN

## 2018-05-25 NOTE — PROGRESS NOTES
Physical Therapy Daily Note     Name: Lizbeth Nguyen  Clinic Number: [id number]  Diagnosis:   Encounter Diagnoses   Name Primary?    Chronic right-sided low back pain without sciatica Yes    Weakness of trunk musculature      Physician: Manuel Ibarra MD  Precautions: standard   Visit #: 3 of 20  PTA Visit #: 2    Time In: 1530  Time Out: 1600  Total Treatment Time: 30 minutes (1:1 with PTA 30 minutes of treatment session)     Subjective     Pt reports: Lizbeth states she doesn't have much pain. Patient describes it more as a discomfort in her right low back. Patient is still non-compliant with HEP.  Pain Scale: Lizbeth rates pain on a scale of 0-10 to be 4 currently.    Objective     Lizbeth received individual therapeutic exercises to develop strength, endurance, ROM, flexibility, posture and core stabilization for 45 minutes including:    Nu step x 7 minutes (not performed today)    SKTC 10 sec x 5   Supine piriformis stretch 20 sec x 3   Supine hip flexor stretch 20 sec x 3   LTR on physioball x 2 minutes  Supine hip adduction ball squeeze x 2 minutes   Posterior pelvic tilt x 2 minutes, 3 sec hold  Supine bridging 2x10  SL clamshells 2x10 ea (RTB)  Open book 1x10 ea   Seated ball roll outs x 3 directions x 3 minutes     Lizbeth received the following manual therapy techniques: Soft tissue Mobilization were applied to the: right hip for 00 minutes including:  IASTM with rolling stick to right QL, glut and IT band    Lizbeth received moist heat to lumbar spine x 00 minutes post treatment session. (supine, bolster under knees)    Written Home Exercises Provided: Reviewed HEP provided during initial evaluation.   Pt demo good understanding of the education provided. Lizbeth demonstrated good return demonstration of activities.     Education provided re:Lizbeth verbalized good understanding of education provided.   No spiritual or educational barriers to  learning provided    Assessment     Lizbeth tolerated treatment well today. Session shortened today due to patient arriving late. Patient demonstrates good tolerance to gluteal/core strengthening exercises today with appropriate training effect achieved. Patient with good response to exercises and demonstrates no provocation of lower back pain post session.   This is a 38 y.o. female referred to outpatient physical therapy and presents with a medical diagnosis of lower back pain and demonstrates limitations as described in the problem list. Pt prognosis is Good. Pt will continue to benefit from skilled outpatient physical therapy to address the deficits listed in the problem list, provide pt/family education and to maximize pt's level of independence in the home and community environment.     Goals as follows:  GOALS: Short Term Goals:  2-3 weeks  1. Report decreased in pain at worse less than  <   / =  1 /10  to increase tolerance for functional activities  2. Pt to increase B Ely's Test by greater than  > or = 5 degrees in order to improve flexibility and posture.   3. Pt to tolerate HEP to improve ROM and independence with ADL's      Long Term Goals: 6-8 weeks  1. Report decreased in pain at worse less than  <   / =  0  /10  to increase tolerance for functional mobility.   2. Pt will report at 40% or less limitation on FOTO lumbar spine survey  to demonstrate decrease in disability and improvement in back pain.  3. Pt to be Independent with HEP to improve ROM and independence with ADL's  4. Pt to increase B Ely's Test by greater than > or = 10 degrees in order to improve flexibility and posture.   5. Pt to demonstrate negative Bridge Test in order to show improved core strength for lumbar stabilization     Plan     Continue with established Plan of Care towards PT goals.    Therapist: Lizz Schwarz, PTA  5/25/2018

## 2018-05-29 ENCOUNTER — CLINICAL SUPPORT (OUTPATIENT)
Dept: REHABILITATION | Facility: HOSPITAL | Age: 38
End: 2018-05-29
Attending: FAMILY MEDICINE
Payer: COMMERCIAL

## 2018-05-29 DIAGNOSIS — G89.29 CHRONIC RIGHT-SIDED LOW BACK PAIN WITHOUT SCIATICA: Primary | ICD-10-CM

## 2018-05-29 DIAGNOSIS — M62.81 WEAKNESS OF TRUNK MUSCULATURE: ICD-10-CM

## 2018-05-29 DIAGNOSIS — M54.50 CHRONIC RIGHT-SIDED LOW BACK PAIN WITHOUT SCIATICA: Primary | ICD-10-CM

## 2018-05-29 PROCEDURE — 97110 THERAPEUTIC EXERCISES: CPT | Mod: PN

## 2018-05-29 NOTE — PROGRESS NOTES
Physical Therapy Daily Note     Name: Lizbeth Nguyen  Clinic Number: [id number]  Diagnosis:   Encounter Diagnoses   Name Primary?    Chronic right-sided low back pain without sciatica Yes    Weakness of trunk musculature      Physician: Manuel Ibarra MD  Precautions: standard   Visit #: 4 of 20  PTA Visit #: 3    Time In: 1540  Time Out: 1635  Total Treatment Time: 55 minutes (1:1 with PTA 30 minutes of treatment session)     Subjective     Pt reports: Lizbeth states she has just enough lower back pain to aggravate her. Patient states she does feel better after therapy sessions.   Pain Scale: Lizbeth rates pain on a scale of 0-10 to be 3 currently.    Objective     Lizbeth received individual therapeutic exercises to develop strength, endurance, ROM, flexibility, posture and core stabilization for 45 minutes including:    Upright bike x 7 minutes     SKTC 10 sec x 5   Supine piriformis stretch 20 sec x 3   Supine hip flexor stretch 20 sec x 3   LTR on physioball x 2 minutes  Supine hip adduction ball squeeze x 2 minutes   Posterior pelvic tilt x 2 minutes, 3 sec hold  Supine bridging 2x10  SL clamshells 2x10 ea (GTB)  Open book 1x10 ea   +Prone hip extension 1x10 ea   +BLE shuttle squats 3x10 2 black cords  +Standing shoulder extensions (GTB) 2x10  Seated ball roll outs x 3 directions x 3 minutes     Lizbeth received the following manual therapy techniques: Soft tissue Mobilization were applied to the: right hip for 00 minutes including:  IASTM with rolling stick to right QL, glut and IT band    Lizbeth received ice to lumbar spine x 10 minutes post treatment session. (supine, bolster under knees)    Written Home Exercises Provided: Reviewed HEP provided during initial evaluation.   Pt demo good understanding of the education provided. Lizbeth demonstrated good return demonstration of activities.     Education provided re:Lizbeth verbalized good  understanding of education provided.   No spiritual or educational barriers to learning provided    Assessment     Lizbeth tolerated treatment well today. Patient demonstrates very good tolerance to progression of therapeutic exercise with no complaints of lower back pain noted. Patient demonstrates good gluteal activation and she is able to maintain with prone hip extension. Patient demonstrates improvements in hip flexor flexibility with appropriate therapeutic effect achieved with mobility focused exercises.   This is a 38 y.o. female referred to outpatient physical therapy and presents with a medical diagnosis of lower back pain and demonstrates limitations as described in the problem list. Pt prognosis is Good. Pt will continue to benefit from skilled outpatient physical therapy to address the deficits listed in the problem list, provide pt/family education and to maximize pt's level of independence in the home and community environment.     Goals as follows:  GOALS: Short Term Goals:  2-3 weeks  1. Report decreased in pain at worse less than  <   / =  1 /10  to increase tolerance for functional activities  2. Pt to increase B Ely's Test by greater than  > or = 5 degrees in order to improve flexibility and posture.   3. Pt to tolerate HEP to improve ROM and independence with ADL's      Long Term Goals: 6-8 weeks  1. Report decreased in pain at worse less than  <   / =  0  /10  to increase tolerance for functional mobility.   2. Pt will report at 40% or less limitation on FOTO lumbar spine survey  to demonstrate decrease in disability and improvement in back pain.  3. Pt to be Independent with HEP to improve ROM and independence with ADL's  4. Pt to increase B Ely's Test by greater than > or = 10 degrees in order to improve flexibility and posture.   5. Pt to demonstrate negative Bridge Test in order to show improved core strength for lumbar stabilization     Plan     Continue with established Plan of Care  towards PT goals.    Therapist: Lizz Schwarz, PTA  5/29/2018

## 2018-06-01 ENCOUNTER — CLINICAL SUPPORT (OUTPATIENT)
Dept: REHABILITATION | Facility: HOSPITAL | Age: 38
End: 2018-06-01
Attending: FAMILY MEDICINE
Payer: COMMERCIAL

## 2018-06-01 DIAGNOSIS — M62.81 WEAKNESS OF TRUNK MUSCULATURE: ICD-10-CM

## 2018-06-01 DIAGNOSIS — G89.29 CHRONIC RIGHT-SIDED LOW BACK PAIN WITHOUT SCIATICA: ICD-10-CM

## 2018-06-01 DIAGNOSIS — M54.50 CHRONIC RIGHT-SIDED LOW BACK PAIN WITHOUT SCIATICA: ICD-10-CM

## 2018-06-01 PROCEDURE — 97110 THERAPEUTIC EXERCISES: CPT | Mod: PN

## 2018-06-01 NOTE — PROGRESS NOTES
Physical Therapy Daily Note     Name: Lizbeth Nguyen  Clinic Number: [id number]  Diagnosis:   Encounter Diagnoses   Name Primary?    Chronic right-sided low back pain without sciatica     Weakness of trunk musculature      Physician: Manuel Ibarra MD  Precautions: standard   Visit #: 5 of 20  PTA Visit #: 4    Time In: 1510  Time Out: 1600  Total Treatment Time: 50 minutes (1:1 with PTA 40 minutes of treatment session)    Subjective     Pt reports: Lizbeth states she is a little more sore than usual today. Patient states the right side of her back is bugging her today. Patient states she was a little more sore after last treatment session.   Pain Scale: Lizbeth rates pain on a scale of 0-10 to be 5 currently.    Objective     Lizbeth received individual therapeutic exercises to develop strength, endurance, ROM, flexibility, posture and core stabilization for 45 minutes including:    Upright bike x 7 minutes     Supine piriformis stretch 20 sec x 3   Supine hip flexor stretch 20 sec x 3   LTR on physioball x 2 minutes (UE overhead)  DKTC on physioball x 2 minutes (UE overhead)   Supine hip adduction ball squeeze x 2 minutes   Posterior pelvic tilt x 2 minutes, 3 sec hold  Supine bridging 2x10 (with ball squeeze)   SL clamshells 2x10 ea (GTB)  Open book 1x10 ea   +Prone hip extension 1x10 ea   +BLE shuttle squats 3x10 2 black cords  +Standing shoulder extensions (GTB) 2x10  Seated ball roll outs x 3 directions x 3 minutes     Lizbeth received the following manual therapy techniques: Soft tissue Mobilization were applied to the: right hip for 10 minutes including:  IASTM with rolling stick to right QL, glut and IT band    Lizbeth received moist heat to lumbar spine x 10 minutes post treatment session. (supine, bolster under knees)    Written Home Exercises Provided: Reviewed HEP provided during initial evaluation.   Pt demo good understanding of the education  provided. Lizbeth demonstrated good return demonstration of activities.     Education provided re:Lizbeth verbalized good understanding of education provided.   No spiritual or educational barriers to learning provided    Assessment     Lizbeth tolerated treatment well today. Patient with tissue restriction through right QL and superior gluteal with fair tolerance to trigger point release and manual stretching noted. Patient able to perform bridging with ball squeeze today good maintenance of posterior pelvic tilt noted. Patient able to perform all exercises without complaints of increased pain with good tolerance to treatment noted.   This is a 38 y.o. female referred to outpatient physical therapy and presents with a medical diagnosis of lower back pain and demonstrates limitations as described in the problem list. Pt prognosis is Good. Pt will continue to benefit from skilled outpatient physical therapy to address the deficits listed in the problem list, provide pt/family education and to maximize pt's level of independence in the home and community environment.     Goals as follows:  GOALS: Short Term Goals:  2-3 weeks  1. Report decreased in pain at worse less than  <   / =  1 /10  to increase tolerance for functional activities  2. Pt to increase B Ely's Test by greater than  > or = 5 degrees in order to improve flexibility and posture.   3. Pt to tolerate HEP to improve ROM and independence with ADL's      Long Term Goals: 6-8 weeks  1. Report decreased in pain at worse less than  <   / =  0  /10  to increase tolerance for functional mobility.   2. Pt will report at 40% or less limitation on FOTO lumbar spine survey  to demonstrate decrease in disability and improvement in back pain.  3. Pt to be Independent with HEP to improve ROM and independence with ADL's  4. Pt to increase B Ely's Test by greater than > or = 10 degrees in order to improve flexibility and posture.   5. Pt to demonstrate negative Bridge  Test in order to show improved core strength for lumbar stabilization     Plan     Continue with established Plan of Care towards PT goals.    Therapist: Lizz Schwarz, PTA  6/1/2018

## 2018-06-05 ENCOUNTER — CLINICAL SUPPORT (OUTPATIENT)
Dept: REHABILITATION | Facility: HOSPITAL | Age: 38
End: 2018-06-05
Attending: FAMILY MEDICINE
Payer: COMMERCIAL

## 2018-06-05 DIAGNOSIS — M54.50 CHRONIC RIGHT-SIDED LOW BACK PAIN WITHOUT SCIATICA: ICD-10-CM

## 2018-06-05 DIAGNOSIS — G89.29 CHRONIC RIGHT-SIDED LOW BACK PAIN WITHOUT SCIATICA: ICD-10-CM

## 2018-06-05 DIAGNOSIS — M62.81 WEAKNESS OF TRUNK MUSCULATURE: ICD-10-CM

## 2018-06-05 PROCEDURE — 97110 THERAPEUTIC EXERCISES: CPT | Mod: PN

## 2018-06-05 NOTE — PROGRESS NOTES
Physical Therapy Daily Note     Name: Lizbeth Nguyen  Clinic Number: [id number]  Diagnosis:   Encounter Diagnoses   Name Primary?    Chronic right-sided low back pain without sciatica     Weakness of trunk musculature      Physician: Manuel Ibarra MD  Precautions: standard   Visit #: 6 of 20  PTA Visit #: 4    Time In: 1510  Time Out: 1600  Total Treatment Time: 50 minutes (1:1 with PTA 40 minutes of treatment session)    Subjective     Pt reports: Lizbeth states she is feeling good today. Pt denies any lower back pain but she states on last Thursday she had R lower back pain.     Pain Scale: Lizbeth rates pain on a scale of 0-10 to be 0 currently.    Objective     Lizbeth received individual therapeutic exercises to develop strength, endurance, ROM, flexibility, posture and core stabilization for 55 minutes including:    Upright bike x 7 minutes     Supine piriformis stretch 20 sec x 3   Supine hip flexor stretch 20 sec x 3   LTR on physioball x 2 minutes (UE overhead)  DKTC on physioball x 2 minutes (UE overhead)   Supine hip adduction ball squeeze x 2 minutes   Posterior pelvic tilt x 2 minutes, 3 sec hold  Supine bridging 2x10 (with ball squeeze)   SL clamshells 2x10 ea (GTB)  Open book 1x10 ea   +Prone hip extension 1x10 ea   +BLE shuttle squats 3x10 2 black cords  +Standing shoulder extensions (GTB) 2x10  Seated ball roll outs x 3 directions x 3 minutes   Multifidus side steps 10 each side  Trunk extension machine 40# 3x10     Lizbeth received the following manual therapy techniques: Soft tissue Mobilization were applied to the: right hip for 0 minutes including:  IASTM with rolling stick to right QL, glut and IT band    Lizbeth received moist heat to lumbar spine x 10 minutes post treatment session. (supine, bolster under knees)    Written Home Exercises Provided: Reviewed HEP provided during initial evaluation.   Pt demo good understanding of the  education provided. Lizbeth demonstrated good return demonstration of activities.     Education provided re:Lizbeth verbalized good understanding of education provided.   No spiritual or educational barriers to learning provided    Assessment     Lizbeth tolerated treatment well today. Pt did not presented with any provocation in lower back pain. Pt demonstrated decrease core musculature to support lower back. Pt demonstrated good tolerance to progression of therapeutic exercises. Pt tolerated well trunk extension and multifidus side steps. Pt felt right lower back and hamstring muscle fatigue in the end of PT session. Cont skilled PT services to decrease functional limitations.   This is a 38 y.o. female referred to outpatient physical therapy and presents with a medical diagnosis of lower back pain and demonstrates limitations as described in the problem list. Pt prognosis is Good. Pt will continue to benefit from skilled outpatient physical therapy to address the deficits listed in the problem list, provide pt/family education and to maximize pt's level of independence in the home and community environment.     Goals as follows:  GOALS: Short Term Goals:  2-3 weeks  1. Report decreased in pain at worse less than  <   / =  1 /10  to increase tolerance for functional activities  2. Pt to increase B Ely's Test by greater than  > or = 5 degrees in order to improve flexibility and posture.   3. Pt to tolerate HEP to improve ROM and independence with ADL's      Long Term Goals: 6-8 weeks  1. Report decreased in pain at worse less than  <   / =  0  /10  to increase tolerance for functional mobility.   2. Pt will report at 40% or less limitation on FOTO lumbar spine survey  to demonstrate decrease in disability and improvement in back pain.  3. Pt to be Independent with HEP to improve ROM and independence with ADL's  4. Pt to increase B Ely's Test by greater than > or = 10 degrees in order to improve flexibility and  posture.   5. Pt to demonstrate negative Bridge Test in order to show improved core strength for lumbar stabilization     Plan     Continue with established Plan of Care towards PT goals.    Therapist: Griffin Gerard, PT  6/5/2018

## 2018-06-08 ENCOUNTER — CLINICAL SUPPORT (OUTPATIENT)
Dept: REHABILITATION | Facility: HOSPITAL | Age: 38
End: 2018-06-08
Attending: FAMILY MEDICINE
Payer: COMMERCIAL

## 2018-06-08 DIAGNOSIS — M62.81 WEAKNESS OF TRUNK MUSCULATURE: ICD-10-CM

## 2018-06-08 DIAGNOSIS — M54.50 CHRONIC RIGHT-SIDED LOW BACK PAIN WITHOUT SCIATICA: Primary | ICD-10-CM

## 2018-06-08 DIAGNOSIS — G89.29 CHRONIC RIGHT-SIDED LOW BACK PAIN WITHOUT SCIATICA: Primary | ICD-10-CM

## 2018-06-08 PROCEDURE — 97110 THERAPEUTIC EXERCISES: CPT | Mod: PN

## 2018-06-08 NOTE — PROGRESS NOTES
Physical Therapy Daily Note     Name: Lizbeth Nguyen  Clinic Number: [id number]  Diagnosis:   Encounter Diagnoses   Name Primary?    Chronic right-sided low back pain without sciatica Yes    Weakness of trunk musculature      Physician: Manuel Ibarra MD  Precautions: standard   Visit #: 7 of 20  PTA Visit #: 1    Time In: 1100  Time Out: 1155  Total Treatment Time: 55 minutes (1:1 with PTA 30 minutes of treatment session)    Subjective     Pt reports: Lizbeth states she had some hamstring cramping after last session. Patient states her lower back felt good after last session but she was sore the next day. Patient states she is pain free currently.     Pain Scale: Lizbeth rates pain on a scale of 0-10 to be 0 currently.    Objective     Lizbeth received individual therapeutic exercises to develop strength, endurance, ROM, flexibility, posture and core stabilization for 55 minutes including:    Upright bike x 7 minutes     +Standing gastroc stretch 30 sec x 3   +Standing hamstring stretch 20 sec x 3   Supine piriformis stretch 20 sec x 3   LTR on physioball x 2 minutes (UE overhead)  DKTC on physioball x 2 minutes (UE overhead)   Posterior pelvic tilt x 2 minutes, 3 sec hold  +PPT with BUE pulldowns 2x10 (GTB)  Supine bridging 2x10 (with ball squeeze)   SL clamshells 2x10 ea (GTB)  Open book 1x10 ea   +Prone hip extension 1x10 ea   Prone hip flexor stretch 30 sec x 3   BLE shuttle squats 3x10 2 black cords  Standing shoulder extensions (GTB) 2x10  Seated ball roll outs x 3 directions x 3 minutes   Multifidus side steps 15 each side (GTB)  Trunk extension machine 40# 3x10     Lizbeth received the following manual therapy techniques: Soft tissue Mobilization were applied to the: right hip for 0 minutes including:  IASTM with rolling stick to right QL, glut and IT band    Lizbeth received moist heat to lumbar spine x 10 minutes post treatment session. (supine,  ab under knees)    Written Home Exercises Provided: Reviewed HEP provided during initial evaluation.   Pt demo good understanding of the education provided. Lizbeth demonstrated good return demonstration of activities.     Education provided re:Lizbeth verbalized good understanding of education provided.   No spiritual or educational barriers to learning provided    Assessment     Lizbeth tolerated treatment well today. Patient with good tolerance to initiation of hamstring/gastroc stretching with therapeutic effect easily achieved. Patient with good tolerance to progression of hip/core stabilization exercises today with no complaints of pain. Cont skilled PT services to decrease functional limitations.   This is a 38 y.o. female referred to outpatient physical therapy and presents with a medical diagnosis of lower back pain and demonstrates limitations as described in the problem list. Pt prognosis is Good. Pt will continue to benefit from skilled outpatient physical therapy to address the deficits listed in the problem list, provide pt/family education and to maximize pt's level of independence in the home and community environment.     Goals as follows:  GOALS: Short Term Goals:  2-3 weeks  1. Report decreased in pain at worse less than  <   / =  1 /10  to increase tolerance for functional activities  2. Pt to increase B Ely's Test by greater than  > or = 5 degrees in order to improve flexibility and posture.   3. Pt to tolerate HEP to improve ROM and independence with ADL's      Long Term Goals: 6-8 weeks  1. Report decreased in pain at worse less than  <   / =  0  /10  to increase tolerance for functional mobility.   2. Pt will report at 40% or less limitation on FOTO lumbar spine survey  to demonstrate decrease in disability and improvement in back pain.  3. Pt to be Independent with HEP to improve ROM and independence with ADL's  4. Pt to increase B Ely's Test by greater than > or = 10 degrees in order  to improve flexibility and posture.   5. Pt to demonstrate negative Bridge Test in order to show improved core strength for lumbar stabilization     Plan     Continue with established Plan of Care towards PT goals.    Therapist: Lizz Schwarz, PTA  6/8/2018

## 2018-06-12 ENCOUNTER — CLINICAL SUPPORT (OUTPATIENT)
Dept: REHABILITATION | Facility: HOSPITAL | Age: 38
End: 2018-06-12
Attending: FAMILY MEDICINE
Payer: COMMERCIAL

## 2018-06-12 DIAGNOSIS — M54.50 CHRONIC RIGHT-SIDED LOW BACK PAIN WITHOUT SCIATICA: ICD-10-CM

## 2018-06-12 DIAGNOSIS — G89.29 CHRONIC RIGHT-SIDED LOW BACK PAIN WITHOUT SCIATICA: ICD-10-CM

## 2018-06-12 DIAGNOSIS — M62.81 WEAKNESS OF TRUNK MUSCULATURE: ICD-10-CM

## 2018-06-12 PROCEDURE — 97110 THERAPEUTIC EXERCISES: CPT | Mod: PN

## 2018-06-12 NOTE — PROGRESS NOTES
Physical Therapy Daily Note     Name: Lizbeth Nguyen  Clinic Number: [id number]  Diagnosis:   Encounter Diagnoses   Name Primary?    Chronic right-sided low back pain without sciatica     Weakness of trunk musculature      Physician: Manuel Ibarra MD  Precautions: standard   Visit #: 8 of 20  PTA Visit #: 1    Time In: 2:05 pm  Time Out: 3:00 pm  Total Treatment Time: 55 minutes (1:1 with PT 55 minutes of treatment session)    Subjective     Pt reports: Lizbeth states that she has less lower back pain. Pt states she has no lower back pain today she had some hamstring cramping after last session. Patient states her lower back felt good after last session but she was sore the next day. Patient states she is pain free currently.     Pain Scale: Lizbeth rates pain on a scale of 0-10 to be 0 currently.    Objective     Lizbeth received individual therapeutic exercises to develop strength, endurance, ROM, flexibility, posture and core stabilization for 55 minutes including:    Upright bike x 7 minutes     +Standing gastroc stretch 30 sec x 3   +Standing hamstring stretch 20 sec x 3   Supine piriformis stretch 20 sec x 3   LTR on physioball x 2 minutes (UE overhead)  DKTC on physioball x 2 minutes (UE overhead)   Posterior pelvic tilt x 2 minutes, 3 sec hold  +PPT with BUE pulldowns 2x10 (GTB)  Supine bridging 2x10 (with ball squeeze)   SL clamshells 2x10 ea (GTB)  Open book 1x10 ea   +Prone hip extension 1x10 ea   Prone hip flexor stretch 30 sec x 3   BLE shuttle squats 3x10 2 black cords  Standing shoulder extensions (GTB) 2x10  Seated ball roll outs x 3 directions x 3 minutes   Multifidus side steps 15 each side (GTB)  Trunk extension machine 44# 3x10   Torso rotation machine  3x10 21#    Lizbeth received the following manual therapy techniques: Soft tissue Mobilization were applied to the: right hip for 0 minutes including:  IASTM with rolling stick to right  QL, glut and IT band    Lizbeth received moist heat to lumbar spine x 10 minutes post treatment session. (supine, bolster under knees)    Written Home Exercises Provided: Reviewed HEP provided during initial evaluation.   Pt demo good understanding of the education provided. Lizbeth demonstrated good return demonstration of activities.     Education provided re:Lizbeth verbalized good understanding of education provided.   No spiritual or educational barriers to learning provided    Assessment     Lizbeth tolerated treatment well today. Pt did not present any lower back pain provocation in therapy today. Pt demonstrated weakness of core musculature. Torso trunk rotation machine was added to improve core musculature muscle strength. Pt tolerated increase of trunk extension machine to 44 lbs. Pt has good tolerance to all other therapeutic exercises. Plan to progress exercises with pain free.. Cont skilled PT services to decrease functional limitations.   This is a 38 y.o. female referred to outpatient physical therapy and presents with a medical diagnosis of lower back pain and demonstrates limitations as described in the problem list. Pt prognosis is Good. Pt will continue to benefit from skilled outpatient physical therapy to address the deficits listed in the problem list, provide pt/family education and to maximize pt's level of independence in the home and community environment.     Goals as follows:  GOALS: Short Term Goals:  2-3 weeks  1. Report decreased in pain at worse less than  <   / =  1 /10  to increase tolerance for functional activities  2. Pt to increase B Ely's Test by greater than  > or = 5 degrees in order to improve flexibility and posture.   3. Pt to tolerate HEP to improve ROM and independence with ADL's      Long Term Goals: 6-8 weeks  1. Report decreased in pain at worse less than  <   / =  0  /10  to increase tolerance for functional mobility.   2. Pt will report at 40% or less limitation on  FOTO lumbar spine survey  to demonstrate decrease in disability and improvement in back pain.  3. Pt to be Independent with HEP to improve ROM and independence with ADL's  4. Pt to increase B Ely's Test by greater than > or = 10 degrees in order to improve flexibility and posture.   5. Pt to demonstrate negative Bridge Test in order to show improved core strength for lumbar stabilization     Plan     Continue with established Plan of Care towards PT goals.    Therapist: Griffin Gerard, PT  6/12/2018

## 2018-06-15 ENCOUNTER — CLINICAL SUPPORT (OUTPATIENT)
Dept: REHABILITATION | Facility: HOSPITAL | Age: 38
End: 2018-06-15
Attending: FAMILY MEDICINE
Payer: COMMERCIAL

## 2018-06-15 DIAGNOSIS — M54.50 CHRONIC RIGHT-SIDED LOW BACK PAIN WITHOUT SCIATICA: ICD-10-CM

## 2018-06-15 DIAGNOSIS — G89.29 CHRONIC RIGHT-SIDED LOW BACK PAIN WITHOUT SCIATICA: ICD-10-CM

## 2018-06-15 DIAGNOSIS — M62.81 WEAKNESS OF TRUNK MUSCULATURE: ICD-10-CM

## 2018-06-15 PROCEDURE — 97110 THERAPEUTIC EXERCISES: CPT | Mod: PN

## 2018-06-15 NOTE — PROGRESS NOTES
Physical Therapy Daily Note     Name: Lizbeth Nguyen  Clinic Number: [id number]  Diagnosis:   Encounter Diagnoses   Name Primary?    Chronic right-sided low back pain without sciatica     Weakness of trunk musculature      Physician: Manuel Ibarra MD  Precautions: standard   Visit #: 8 of 20  PTA Visit #: 1    Time In: 1136  Time Out: 1230  Total Treatment Time: 54 minutes (1:1 with PTA 30 minutes of treatment session)    Subjective     Pt reports: Lizbeth states her lower back has its good and bad days. Patient states she is currently pain free this morning.     Pain Scale: Lizbeth rates pain on a scale of 0-10 to be 0 currently.    Objective     Lizbeth received individual therapeutic exercises to develop strength, endurance, ROM, flexibility, posture and core stabilization for 55 minutes including:    Upright bike x 7 minutes     Standing gastroc stretch 30 sec x 3   Standing hamstring stretch 20 sec x 3   Supine piriformis stretch 20 sec x 3   LTR on physioball x 2 minutes (UE overhead)  DKTC on physioball x 2 minutes (UE overhead)   Posterior pelvic tilt x 2 minutes, 3 sec hold  PPT with BUE pulldowns 2x10 (GTB)  Supine bridging 2x10 (with ball squeeze)   SL clamshells 2x10 ea (GTB)  Open book 1x10 ea   Prone hip extension 2x10 ea   Prone hip flexor stretch 30 sec x 3   BLE shuttle squats 3x10 2 black cords  Standing shoulder extensions (GTB) 2x10  Seated ball roll outs x 3 directions x 3 minutes   Multifidus side steps 15 each side (GTB)  Trunk extension machine 44# 3x10   Torso rotation machine  3x10 21#    Lizbeth received the following manual therapy techniques: Soft tissue Mobilization were applied to the: right hip for 05 minutes including:  Kinesiotape star technique over painful point. Patient received education regarding appropriate care and removal of Kinesiotape. Patient instructed in proper removal techniques if skin irritation  occurs.    Lizbeth received ice t to lumbar spine x 10 minutes post treatment session. (supine, bolster under knees)    Written Home Exercises Provided: Reviewed HEP provided during initial evaluation.   Pt demo good understanding of the education provided. Lizbeth demonstrated good return demonstration of activities.     Education provided re:Lizbeth verbalized good understanding of education provided.   No spiritual or educational barriers to learning provided    Assessment     Lizbeth tolerated treatment well today. Patient tender with direct palpation to right superior gluteal today; kinesiotape applied to promote pain relief and tissue mobility. Patient required verbal cues to promote core activation when dual tasking with fair correction achieved. Patient able to perform all exercises without complaints of lower back pain. Cont skilled PT services to decrease functional limitations.   This is a 38 y.o. female referred to outpatient physical therapy and presents with a medical diagnosis of lower back pain and demonstrates limitations as described in the problem list. Pt prognosis is Good. Pt will continue to benefit from skilled outpatient physical therapy to address the deficits listed in the problem list, provide pt/family education and to maximize pt's level of independence in the home and community environment.     Goals as follows:  GOALS: Short Term Goals:  2-3 weeks  1. Report decreased in pain at worse less than  <   / =  1 /10  to increase tolerance for functional activities  2. Pt to increase B Ely's Test by greater than  > or = 5 degrees in order to improve flexibility and posture.   3. Pt to tolerate HEP to improve ROM and independence with ADL's      Long Term Goals: 6-8 weeks  1. Report decreased in pain at worse less than  <   / =  0  /10  to increase tolerance for functional mobility.   2. Pt will report at 40% or less limitation on FOTO lumbar spine survey  to demonstrate decrease in disability  and improvement in back pain.  3. Pt to be Independent with HEP to improve ROM and independence with ADL's  4. Pt to increase B Ely's Test by greater than > or = 10 degrees in order to improve flexibility and posture.   5. Pt to demonstrate negative Bridge Test in order to show improved core strength for lumbar stabilization     Plan     Continue with established Plan of Care towards PT goals.    Therapist: Lizz Schwarz, PTA  6/15/2018

## 2018-06-19 ENCOUNTER — CLINICAL SUPPORT (OUTPATIENT)
Dept: REHABILITATION | Facility: HOSPITAL | Age: 38
End: 2018-06-19
Attending: FAMILY MEDICINE
Payer: COMMERCIAL

## 2018-06-19 DIAGNOSIS — M62.81 WEAKNESS OF TRUNK MUSCULATURE: ICD-10-CM

## 2018-06-19 DIAGNOSIS — M54.50 CHRONIC RIGHT-SIDED LOW BACK PAIN WITHOUT SCIATICA: ICD-10-CM

## 2018-06-19 DIAGNOSIS — G89.29 CHRONIC RIGHT-SIDED LOW BACK PAIN WITHOUT SCIATICA: ICD-10-CM

## 2018-06-19 PROCEDURE — 97110 THERAPEUTIC EXERCISES: CPT | Mod: PN

## 2018-06-19 NOTE — PROGRESS NOTES
Physical Therapy Daily Note     Name: Lizbeth Nguyen  Clinic Number: [id number]  Diagnosis:   Encounter Diagnoses   Name Primary?    Chronic right-sided low back pain without sciatica     Weakness of trunk musculature      Physician: Manuel Ibarra MD  Precautions: standard   Visit #: 9 of 20  PTA Visit #: 1    Time In: 7:02 am  Time Out: 8:02 am  Total Treatment Time: 60 minutes (1:1 with PT 60 minutes of treatment session)    Subjective     Pt reports: Lizbeth states she is pain free today.    Pain Scale: Lizbeth rates pain on a scale of 0-10 to be 0 currently.    Objective     CMS Impairment/Limitation/Restriction for FOTO Lumbar Spine Survey  Status Limitation G-Code CMS Severity Modifier  Intake 75% 25%  Predicted 81% 19% Goal Status+ CI - At least 1 percent but less than 20 percent  6/5/2018 67% 33%  6/19/2018 88% 12% Current Status CI - At least 1 percent but less than 20 percent  D/C Status CI **only report if this is discharge survey  +Based on FOTO predicted change score    Flexibility:               Ely's test: R = 100 degrees ; L = 90 degrees              Popliteal Angle: WFL                 Bridge test: Negative     Lizbeth received individual therapeutic exercises to develop strength, endurance, ROM, flexibility, posture and core stabilization for 53 minutes including:    Upright bike x 7 minutes     Standing gastroc stretch 30 sec x 3   Standing hamstring stretch 20 sec x 3   Supine piriformis stretch 20 sec x 3   LTR on physioball x 2 minutes (UE overhead)  DKTC on physioball x 2 minutes (UE overhead)   Posterior pelvic tilt x 2 minutes, 3 sec hold  PPT with BUE pulldowns 2x10 (GTB)  Supine bridging 2x10 (with ball squeeze)   SL clamshells 2x10 ea (GTB)  Open book 1x10 ea   Prone hip extension 2x10 ea   Prone hip flexor stretch 30 sec x 3   BLE shuttle squats 3x10 2 black cords  Standing shoulder extensions (GTB) 2x10  Seated ball roll outs x  3 directions x 3 minutes   Multifidus side steps 15 each side (GTB)  Trunk extension machine 44# 3x10   Torso rotation machine  3x10 21#    Lizbeth received the following manual therapy techniques: Soft tissue Mobilization were applied to the: right hip for 05 minutes including:  Kinesiotape star technique over painful point. Patient received education regarding appropriate care and removal of Kinesiotape. Patient instructed in proper removal techniques if skin irritation occurs.    Lizbeth received ice t to lumbar spine x 10 minutes post treatment session. (supine, bolster under knees)    Written Home Exercises Provided: HEP was given prior discharge today.   Pt demo good understanding of the education provided. Lizbeth demonstrated good return demonstration of activities.     Education provided re:Lizbeth verbalized good understanding of education provided.   No spiritual or educational barriers to learning provided    Assessment     Lizbeth tolerated treatment well today.Pt was re-assessed today. Pt has been doing well and pain free for the past 2 weeks. Pt demonstrated improvement in FOTO lumbar spine survey indicating only 12% impairment. Pt demonstrated improvement in  Quadriceps flexibility contributing to decrease lower back pain. Pt was negative on Bridge test indicating improvement in core musculature muscle strength. Overall, pt tolerated PT session very well. Pt was independent with therapeutic exercises. Pt was performing HEP by herself with good form. PT has petr all STGs and LTGs. HEP was given today. Pt will be discharge today.   This is a 38 y.o. female referred to outpatient physical therapy and presents with a medical diagnosis of lower back pain and demonstrates limitations as described in the problem list. Pt prognosis is Good. Pt will continue to benefit from skilled outpatient physical therapy to address the deficits listed in the problem list, provide pt/family education and to maximize pt's  level of independence in the home and community environment.     Goals as follows:  GOALS: Short Term Goals:  2-3 weeks updated 6/19/2018  1. Report decreased in pain at worse less than  <   / =  1 /10  to increase tolerance for functional activities (goal met)  2. Pt to increase B Ely's Test by greater than  > or = 5 degrees in order to improve flexibility and posture. (goal met)  3. Pt to tolerate HEP to improve ROM and independence with ADL's (goal met)      Long Term Goals: 6-8 weeks 6/19/2018  1. Report decreased in pain at worse less than  <   / =  0  /10  to increase tolerance for functional mobility. (goal met)  2. Pt will report at 40% or less limitation on FOTO lumbar spine survey  to demonstrate decrease in disability and improvement in back pain.(goal met)  3. Pt to be Independent with HEP to improve ROM and independence with ADL's (goal met)  4. Pt to increase B Ely's Test by greater than > or = 10 degrees in order to improve flexibility and posture.(goal met)   5. Pt to demonstrate negative Bridge Test in order to show improved core strength for lumbar stabilization (goal met)     Plan   Pt will be discharged today.     Therapist: Griffin Gerard, PT  6/19/2018

## 2018-08-08 ENCOUNTER — OFFICE VISIT (OUTPATIENT)
Dept: FAMILY MEDICINE | Facility: CLINIC | Age: 38
End: 2018-08-08
Payer: COMMERCIAL

## 2018-08-08 VITALS
WEIGHT: 212.06 LBS | OXYGEN SATURATION: 99 % | TEMPERATURE: 98 F | SYSTOLIC BLOOD PRESSURE: 112 MMHG | HEART RATE: 82 BPM | HEIGHT: 62 IN | DIASTOLIC BLOOD PRESSURE: 82 MMHG | RESPIRATION RATE: 16 BRPM | BODY MASS INDEX: 39.02 KG/M2

## 2018-08-08 DIAGNOSIS — J01.30 ACUTE NON-RECURRENT SPHENOIDAL SINUSITIS: Primary | ICD-10-CM

## 2018-08-08 PROCEDURE — 99213 OFFICE O/P EST LOW 20 MIN: CPT | Mod: S$GLB,,, | Performed by: FAMILY MEDICINE

## 2018-08-08 PROCEDURE — 3008F BODY MASS INDEX DOCD: CPT | Mod: CPTII,S$GLB,, | Performed by: FAMILY MEDICINE

## 2018-08-08 PROCEDURE — 99999 PR PBB SHADOW E&M-EST. PATIENT-LVL III: CPT | Mod: PBBFAC,,, | Performed by: FAMILY MEDICINE

## 2018-08-08 RX ORDER — METHYLPREDNISOLONE 4 MG/1
TABLET ORAL
Qty: 1 PACKAGE | Refills: 0 | Status: SHIPPED | OUTPATIENT
Start: 2018-08-08 | End: 2018-10-30 | Stop reason: ALTCHOICE

## 2018-08-08 NOTE — LETTER
August 8, 2018    Lizbeth Nguyen  [address] [address]             [facility] [address] [place] [address]  Phone: [phone]  Fax: [phone] Lizbeth Nguyen was seen in my clinic on 08/08/2018.  Please excuse her tardiness due to today's visit.   She may return to work today.  Thank you.    If you have any questions or concerns, please don't hesitate to call.    Sincerely,             Azikiwe K. Lombard, MD

## 2018-08-08 NOTE — PROGRESS NOTES
Chief Complaint   Patient presents with    Sore Throat       Lizbeth Nguyen is a 38 y.o. female who presents per the Chief Complaint.  Pt is known to this practice but has not been seen by me previously.  All known chronic medical issues have been documented.        Sinus Problem   This is a new problem. The current episode started in the past 7 days. The problem has been gradually worsening since onset. There has been no fever. Her pain is at a severity of 4/10. The pain is moderate. Associated symptoms include congestion, coughing, sinus pressure and a sore throat. Pertinent negatives include no chills, diaphoresis, ear pain, headaches, hoarse voice, neck pain, shortness of breath, sneezing or swollen glands. Past treatments include oral decongestants. The treatment provided no relief.        ROS  Review of Systems   Constitutional: Negative for chills, diaphoresis and fever.   HENT: Positive for congestion, postnasal drip, rhinorrhea, sinus pressure, sore throat and trouble swallowing. Negative for dental problem, drooling, ear discharge, ear pain, facial swelling, hearing loss, hoarse voice, mouth sores, nosebleeds and sneezing.    Eyes: Negative for pain and discharge.   Respiratory: Positive for cough. Negative for shortness of breath.    Cardiovascular: Negative for chest pain.   Gastrointestinal: Negative for abdominal pain, constipation, diarrhea, nausea and vomiting.   Genitourinary: Negative for difficulty urinating, dysuria, flank pain and urgency.   Musculoskeletal: Negative.  Negative for myalgias and neck pain.   Skin: Negative.    Allergic/Immunologic: Positive for environmental allergies. Negative for food allergies and immunocompromised state.   Neurological: Negative for dizziness, light-headedness and headaches.   Psychiatric/Behavioral: Negative.        Physical Exam  Vitals:    08/08/18 1202   BP: 112/82   Pulse: 82   Resp: 16   Temp: 98 °F (36.7 °C)    Body mass index is 38.79  "kg/m².  Weight: 96.2 kg (212 lb 1.3 oz)   Height: 5' 2" (157.5 cm)     Physical Exam   Constitutional: She appears well-developed and well-nourished. She is cooperative.  Non-toxic appearance. She does not have a sickly appearance. She does not appear ill. No distress.   HENT:   Head: Normocephalic and atraumatic.   Right Ear: Hearing, external ear and ear canal normal. No tenderness. No foreign bodies. No mastoid tenderness. Tympanic membrane is bulging. Tympanic membrane is not injected, not scarred, not perforated, not erythematous and not retracted. No decreased hearing is noted.   Left Ear: Hearing, external ear and ear canal normal. No tenderness. No foreign bodies. No mastoid tenderness. Tympanic membrane is bulging. Tympanic membrane is not injected, not scarred, not perforated, not erythematous and not retracted. No decreased hearing is noted.   Nose: Mucosal edema and rhinorrhea present. No nose lacerations, sinus tenderness, nasal deformity, septal deviation or nasal septal hematoma. No epistaxis. Right sinus exhibits no maxillary sinus tenderness and no frontal sinus tenderness. Left sinus exhibits no maxillary sinus tenderness and no frontal sinus tenderness.   Mouth/Throat: Uvula is midline and mucous membranes are normal. She does not have dentures. No oral lesions. No dental abscesses or dental caries. Posterior oropharyngeal erythema present. No oropharyngeal exudate, posterior oropharyngeal edema or tonsillar abscesses.   Eyes: Conjunctivae and EOM are normal. Pupils are equal, round, and reactive to light. Right eye exhibits no chemosis, no discharge and no exudate. No foreign body present in the right eye. Left eye exhibits no chemosis, no discharge and no exudate. No foreign body present in the left eye. No scleral icterus.   Neck: Normal range of motion and full passive range of motion without pain. No neck rigidity.   Cardiovascular: Normal rate, regular rhythm, S1 normal, S2 normal and normal " heart sounds.  Exam reveals no gallop and no friction rub.    No murmur heard.  Pulmonary/Chest: Effort normal and breath sounds normal. She has no decreased breath sounds. She has no wheezes. She has no rhonchi. She has no rales.   Lymphadenopathy:        Head (right side): No submental, no submandibular, no tonsillar, no preauricular and no posterior auricular adenopathy present.        Head (left side): No submental, no submandibular, no tonsillar, no preauricular and no posterior auricular adenopathy present.   Neurological: She is alert.       Assessment & Plan    1. Acute non-recurrent sphenoidal sinusitis  Patient was advised to remain hydrated throughout illness and to use an antihistamine like loratadine or cetirizine daily.  I advised that a multi-symptom medication like Nyquil or Tylenol Cold and Flu or a sinus decongestant like Sudafed may raise blood pressure and should be used for no more than three days to alleviate symptoms.  If symptoms worsen, patient should follow up for further evaluation.  Short course of oral steroid given for symptom relief.   - methylPREDNISolone (MEDROL DOSEPACK) 4 mg tablet; use as directed  Dispense: 1 Package; Refill: 0      Follow up documented    ACTIVE MEDICAL ISSUES:  Documented in Problem List    PAST MEDICAL HISTORY  Documented    PAST SURGICAL HISTORY:  Documented    SOCIAL HISTORY:  Documented    FAMILY HISTORY:  Documented    ALLERGIES AND MEDICATIONS: updated and reviewed.  Documented    Health Maintenance       Date Due Completion Date    Influenza Vaccine 08/01/2018 10/3/2017 (Done)    Override on 10/3/2017: Done    Pap Smear with HPV Cotest 09/28/2020 9/28/2017    Override on 8/14/2013: Done    TETANUS VACCINE 10/14/2027 10/14/2017

## 2018-10-30 ENCOUNTER — OFFICE VISIT (OUTPATIENT)
Dept: FAMILY MEDICINE | Facility: CLINIC | Age: 38
End: 2018-10-30
Payer: COMMERCIAL

## 2018-10-30 VITALS
DIASTOLIC BLOOD PRESSURE: 86 MMHG | HEIGHT: 62 IN | OXYGEN SATURATION: 97 % | HEART RATE: 86 BPM | SYSTOLIC BLOOD PRESSURE: 112 MMHG | WEIGHT: 212.06 LBS | BODY MASS INDEX: 39.02 KG/M2 | RESPIRATION RATE: 20 BRPM | TEMPERATURE: 98 F

## 2018-10-30 DIAGNOSIS — R09.81 NASAL CONGESTION: ICD-10-CM

## 2018-10-30 DIAGNOSIS — Z23 NEED FOR INFLUENZA VACCINATION: Primary | ICD-10-CM

## 2018-10-30 PROCEDURE — 99213 OFFICE O/P EST LOW 20 MIN: CPT | Mod: 25,S$GLB,, | Performed by: FAMILY MEDICINE

## 2018-10-30 PROCEDURE — 90471 IMMUNIZATION ADMIN: CPT | Mod: S$GLB,,, | Performed by: FAMILY MEDICINE

## 2018-10-30 PROCEDURE — 99999 PR PBB SHADOW E&M-EST. PATIENT-LVL III: CPT | Mod: PBBFAC,,, | Performed by: FAMILY MEDICINE

## 2018-10-30 PROCEDURE — 3008F BODY MASS INDEX DOCD: CPT | Mod: CPTII,S$GLB,, | Performed by: FAMILY MEDICINE

## 2018-10-30 PROCEDURE — 90686 IIV4 VACC NO PRSV 0.5 ML IM: CPT | Mod: S$GLB,,, | Performed by: FAMILY MEDICINE

## 2018-10-30 NOTE — PROGRESS NOTES
Chief Complaint   Patient presents with    Epistaxis       HPI  Lizbeth Nguyen is a 38 y.o. female with multiple medical diagnoses as listed in the medical history and problem list that presents for epistaxis.    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 +black particle tinged mucous, 2nd episode overall.       Pt is known to me and was last seen by me on 5/2/2018.    PAST MEDICAL HISTORY:  History reviewed. No pertinent past medical history.    PAST SURGICAL HISTORY:  History reviewed. No pertinent surgical history.    SOCIAL HISTORY:  Social History     Socioeconomic History    Marital status: Single     Spouse name: Not on file    Number of children: Not on file    Years of education: Not on file    Highest education level: Not on file   Social Needs    Financial resource strain: Not on file    Food insecurity - worry: Not on file    Food insecurity - inability: Not on file    Transportation needs - medical: Not on file    Transportation needs - non-medical: Not on file   Occupational History    Not on file   Tobacco Use    Smoking status: Never Smoker    Smokeless tobacco: Never Used   Substance and Sexual Activity    Alcohol use: Yes    Drug use: No    Sexual activity: Not on file   Other Topics Concern    Not on file   Social History Narrative    Not on file       FAMILY HISTORY:  Family History   Problem Relation Age of Onset    Hypertension Father        ALLERGIES AND MEDICATIONS: updated and reviewed.  Review of patient's allergies indicates:  No Known Allergies  Current Outpatient Medications   Medication Sig Dispense Refill    ibuprofen (ADVIL) 200 MG tablet Take 200 mg by mouth every 6 (six) hours as needed for Pain.      levocetirizine (XYZAL) 5 MG tablet Take 5 mg by mouth every evening.      fluticasone (FLONASE) 50 mcg/actuation nasal spray 1 spray by Each Nare route once daily. 16 g 5     No  "current facility-administered medications for this visit.        ROS  Review of Systems   Constitutional: Negative for activity change, appetite change and fever.   HENT: Positive for postnasal drip. Negative for congestion and sore throat.    Eyes: Negative for visual disturbance.   Respiratory: Negative for cough and shortness of breath.    Cardiovascular: Negative for chest pain.   Gastrointestinal: Negative for abdominal pain, diarrhea, nausea and vomiting.   Endocrine: Negative.    Genitourinary: Negative for dysuria.   Musculoskeletal: Negative for arthralgias and back pain.   Skin: Negative for rash.   Allergic/Immunologic: Negative.    Neurological: Negative for dizziness, weakness and headaches.   Hematological: Negative.    Psychiatric/Behavioral: Negative for agitation and confusion.       Physical Exam  Vitals:    10/30/18 1318   BP: 112/86   Pulse: 86   Resp: 20   Temp: 98.4 °F (36.9 °C)    Body mass index is 38.79 kg/m².  Weight: 96.2 kg (212 lb 1.3 oz)   Height: 5' 2" (157.5 cm)     Physical Exam   Constitutional: She is oriented to person, place, and time. She appears well-developed and well-nourished.   HENT:   Head: Normocephalic.   Nares - minimally edematous, non erythematous, no evidence of blood/mass   Neurological: She is alert and oriented to person, place, and time.   Psychiatric: She has a normal mood and affect. Her behavior is normal. Judgment and thought content normal.       Health Maintenance       Date Due Completion Date    Influenza Vaccine 08/01/2018 10/3/2017 (Done)    Override on 10/3/2017: Done    Pap Smear with HPV Cotest 09/28/2020 9/28/2017    Override on 8/14/2013: Done    TETANUS VACCINE 10/14/2027 10/14/2017          Assessment & Plan    Need for influenza vaccination  -     Influenza - Quadrivalent (3 years & older) (PF)    Nasal congestion  - Discussed use of nasal rinse/saline  - Monitor closely, refer to ENT        Follow-up in about 4 weeks (around " 11/27/2018).

## 2018-10-30 NOTE — LETTER
October 30, 2018      [facility] [address]  [place] [address]  Phone: [phone]  Fax: [phone]       Patient: Lizbeth Nguyen   YOB: 1980  Date of Visit: 10/30/2018    To Whom It May Concern:    Aisha Nguyen  was at [facility] on 10/30/2018. She may return to work/school on  with no restrictions. If you have any questions or concerns, or if I can be of further assistance, please do not hesitate to contact me.    Sincerely,          Manuel Ibarra MD

## 2018-11-14 ENCOUNTER — PATIENT MESSAGE (OUTPATIENT)
Dept: FAMILY MEDICINE | Facility: CLINIC | Age: 38
End: 2018-11-14

## 2018-12-13 LAB — HM PAP SMEAR: NORMAL

## 2019-04-10 ENCOUNTER — OFFICE VISIT (OUTPATIENT)
Dept: FAMILY MEDICINE | Facility: CLINIC | Age: 39
End: 2019-04-10
Payer: COMMERCIAL

## 2019-04-10 VITALS
DIASTOLIC BLOOD PRESSURE: 78 MMHG | TEMPERATURE: 99 F | HEART RATE: 84 BPM | BODY MASS INDEX: 37.13 KG/M2 | OXYGEN SATURATION: 97 % | SYSTOLIC BLOOD PRESSURE: 112 MMHG | RESPIRATION RATE: 20 BRPM | HEIGHT: 62 IN | WEIGHT: 201.75 LBS

## 2019-04-10 DIAGNOSIS — R19.8 LLQ FULLNESS: Primary | ICD-10-CM

## 2019-04-10 PROCEDURE — 99213 OFFICE O/P EST LOW 20 MIN: CPT | Mod: S$GLB,,, | Performed by: PHYSICIAN ASSISTANT

## 2019-04-10 PROCEDURE — 99999 PR PBB SHADOW E&M-EST. PATIENT-LVL IV: CPT | Mod: PBBFAC,,, | Performed by: PHYSICIAN ASSISTANT

## 2019-04-10 PROCEDURE — 99999 PR PBB SHADOW E&M-EST. PATIENT-LVL IV: ICD-10-PCS | Mod: PBBFAC,,, | Performed by: PHYSICIAN ASSISTANT

## 2019-04-10 PROCEDURE — 99213 PR OFFICE/OUTPT VISIT, EST, LEVL III, 20-29 MIN: ICD-10-PCS | Mod: S$GLB,,, | Performed by: PHYSICIAN ASSISTANT

## 2019-04-10 NOTE — PROGRESS NOTES
Subjective:       Patient ID: Lizbeth Nguyen is a 39 y.o. female.    Chief Complaint: Cyst (LLQ)    HPI: 40 yo female presents for LLQ fullness. Occurring off and on over the past 3 months. Pt states she feels like sometimes there is a balloon in her LLQ/pelvic area. Has not seen a bulging in the area or palpated a mass. No pain. Has BMs daily. No blood in stool. Denies fever. Has mirena, but having normal cycles. No pain with cycles. Denies dyspareunia and vaginal discharge.     Review of Systems   Constitutional: Negative for fever.   Gastrointestinal: Negative for abdominal distention, abdominal pain, constipation and diarrhea.   Genitourinary: Negative for dyspareunia, menstrual problem, pelvic pain and vaginal discharge.       Objective:      Physical Exam   Constitutional: She is oriented to person, place, and time. She appears well-developed and well-nourished.   HENT:   Head: Normocephalic and atraumatic.   Abdominal: Normal appearance. She exhibits no fluid wave and no mass. Bowel sounds are increased. There is no tenderness. There is no rigidity and no guarding. No hernia.   Neurological: She is alert and oriented to person, place, and time.   Skin: Skin is warm and dry.   Psychiatric: She has a normal mood and affect. Her behavior is normal.   Vitals reviewed.      Assessment:       1. LLQ fullness        Plan:         Lizbeth was seen today for cyst.    Diagnoses and all orders for this visit:    LLQ fullness  -     US Pelvis Comp with Transvag NON-OB (xpd; Future  -     Will obtain US to assess

## 2019-04-12 ENCOUNTER — HOSPITAL ENCOUNTER (OUTPATIENT)
Dept: RADIOLOGY | Facility: HOSPITAL | Age: 39
Discharge: HOME | End: 2019-04-12
Attending: PHYSICIAN ASSISTANT
Payer: COMMERCIAL

## 2019-04-12 DIAGNOSIS — R19.8 LLQ FULLNESS: ICD-10-CM

## 2019-04-12 PROCEDURE — 76830 TRANSVAGINAL US NON-OB: CPT | Mod: TC

## 2019-04-12 PROCEDURE — 76830 US PELVIS COMP WITH TRANSVAG NON-OB (XPD): ICD-10-PCS | Mod: 26,,, | Performed by: RADIOLOGY

## 2019-04-12 PROCEDURE — 76856 US PELVIS COMP WITH TRANSVAG NON-OB (XPD): ICD-10-PCS | Mod: 26,,, | Performed by: RADIOLOGY

## 2019-04-12 PROCEDURE — 76856 US EXAM PELVIC COMPLETE: CPT | Mod: 26,,, | Performed by: RADIOLOGY

## 2019-04-12 PROCEDURE — 76830 TRANSVAGINAL US NON-OB: CPT | Mod: 26,,, | Performed by: RADIOLOGY

## 2019-04-17 ENCOUNTER — TELEPHONE (OUTPATIENT)
Dept: FAMILY MEDICINE | Facility: CLINIC | Age: 39
End: 2019-04-17

## 2019-04-17 DIAGNOSIS — R19.8 LLQ FULLNESS: Primary | ICD-10-CM

## 2019-04-17 DIAGNOSIS — R19.00 PELVIC FULLNESS: ICD-10-CM

## 2019-04-17 NOTE — TELEPHONE ENCOUNTER
----- Message from Haleigh Donnelly sent at 4/17/2019 10:24 AM CDT -----  Contact: self ph [phone]  .Type: Lab    Caller is requesting to schedule their Lab appointment prior to annual appointment.    Order is not listed in EPIC.  Please enter order and contact patient to schedule.    Name of Caller:pt    Preferred Date and Time of Labs: 4/19/2019/ Morning      Would the patient rather a call back or a response via [facility]? call    Best Call Back Number:[phone]    Additional Information:Pt requesting CT scan

## 2019-04-17 NOTE — TELEPHONE ENCOUNTER
Patient stated she is okay with having a follow up CT scan , please advise on order for patient, thank you

## 2019-04-25 ENCOUNTER — TELEPHONE (OUTPATIENT)
Dept: FAMILY MEDICINE | Facility: CLINIC | Age: 39
End: 2019-04-25

## 2019-04-25 DIAGNOSIS — R10.2 PELVIC PRESSURE IN FEMALE: Primary | ICD-10-CM

## 2019-04-25 NOTE — TELEPHONE ENCOUNTER
----- Message from Cornelia Villalta sent at 4/25/2019  7:50 AM CDT -----  Patient will be coming in tomorrow morning 4/26 for a Ct scan patient will need lab orders for Creatinine and a order for a Pregnancy urine rapid please. Thanks

## 2019-04-26 ENCOUNTER — HOSPITAL ENCOUNTER (OUTPATIENT)
Dept: RADIOLOGY | Facility: HOSPITAL | Age: 39
Discharge: HOME | End: 2019-04-26
Attending: PHYSICIAN ASSISTANT
Payer: COMMERCIAL

## 2019-04-26 DIAGNOSIS — R19.00 PELVIC FULLNESS: ICD-10-CM

## 2019-04-26 DIAGNOSIS — R19.8 LLQ FULLNESS: ICD-10-CM

## 2019-04-26 PROCEDURE — 74177 CT ABD & PELVIS W/CONTRAST: CPT | Mod: TC

## 2019-04-26 PROCEDURE — 74177 CT ABDOMEN PELVIS WITH CONTRAST: ICD-10-PCS | Mod: 26,,, | Performed by: RADIOLOGY

## 2019-04-26 PROCEDURE — 74177 CT ABD & PELVIS W/CONTRAST: CPT | Mod: 26,,, | Performed by: RADIOLOGY

## 2019-04-26 PROCEDURE — 25500020 PHARM REV CODE 255: Performed by: PHYSICIAN ASSISTANT

## 2019-04-26 RX ADMIN — IOHEXOL 15 ML: 300 INJECTION, SOLUTION INTRAVENOUS at 12:04

## 2019-04-26 RX ADMIN — IOHEXOL 100 ML: 350 INJECTION, SOLUTION INTRAVENOUS at 02:04

## 2019-05-20 ENCOUNTER — TELEPHONE (OUTPATIENT)
Dept: FAMILY MEDICINE | Facility: CLINIC | Age: 39
End: 2019-05-20

## 2019-05-20 ENCOUNTER — PATIENT MESSAGE (OUTPATIENT)
Dept: FAMILY MEDICINE | Facility: CLINIC | Age: 39
End: 2019-05-20

## 2019-05-20 NOTE — TELEPHONE ENCOUNTER
Spoke with patient requesting  results for CT Scan And U/S , per patient will be here to  results , will leave at  .

## 2019-05-20 NOTE — TELEPHONE ENCOUNTER
----- Message from Maria G Wren sent at 5/20/2019  2:25 PM CDT -----  Contact: Self   Type: Patient Call Back    Who called: Self     What is the request in detail: Patient is asking if Ct scan results can be sent to Garett Weston at Fax: [phone] attn to Dr.Richard Tate   Can the clinic reply by [facility]? Call     Would the patient rather a call back or a response via [facility]? Call     Best call back number: [phone]

## 2019-06-12 NOTE — PROGRESS NOTES
-- Message is from the [facility]--    Order Request  Lab: blood testing     Message / reason: Patient will be coming in to the office on 7/10 for the medication follow up. Will need the orders for testing placed in the system so that he can get the results can be get received prior to his appointment.               Preferred Delivery Method   Call when ready for pickup - phone number to notify: [phone]     Caller Information       Type Contact Phone    06/12/2019 06:29 PM Phone (Incoming) Dominick Ernst (Self) [phone] (H)          Alternative phone number: n/a    Turnaround time given to caller:   \"This message will be sent to [state Provider's name]. The clinical team will fulfill your request as soon as they review your message when the office opens tomorrow.\"   See initial evaluation from POC.    Thank you for your referral.

## 2020-06-18 ENCOUNTER — OFFICE VISIT (OUTPATIENT)
Dept: FAMILY MEDICINE | Facility: CLINIC | Age: 40
End: 2020-06-18
Payer: COMMERCIAL

## 2020-06-18 ENCOUNTER — LAB VISIT (OUTPATIENT)
Dept: LAB | Facility: HOSPITAL | Age: 40
End: 2020-06-18
Attending: FAMILY MEDICINE
Payer: COMMERCIAL

## 2020-06-18 VITALS
RESPIRATION RATE: 20 BRPM | HEIGHT: 62 IN | TEMPERATURE: 99 F | HEART RATE: 92 BPM | OXYGEN SATURATION: 97 % | BODY MASS INDEX: 40.85 KG/M2 | WEIGHT: 222 LBS | SYSTOLIC BLOOD PRESSURE: 138 MMHG | DIASTOLIC BLOOD PRESSURE: 86 MMHG

## 2020-06-18 DIAGNOSIS — Z00.00 WELL ADULT EXAM: ICD-10-CM

## 2020-06-18 DIAGNOSIS — R10.11 RIGHT UPPER QUADRANT ABDOMINAL PAIN: ICD-10-CM

## 2020-06-18 DIAGNOSIS — E66.01 SEVERE OBESITY (BMI >= 40): ICD-10-CM

## 2020-06-18 DIAGNOSIS — Z00.00 WELL ADULT EXAM: Primary | ICD-10-CM

## 2020-06-18 PROBLEM — G89.29 CHRONIC RIGHT-SIDED LOW BACK PAIN WITHOUT SCIATICA: Status: RESOLVED | Noted: 2018-05-15 | Resolved: 2020-06-18

## 2020-06-18 PROBLEM — M54.50 CHRONIC RIGHT-SIDED LOW BACK PAIN WITHOUT SCIATICA: Status: RESOLVED | Noted: 2018-05-15 | Resolved: 2020-06-18

## 2020-06-18 LAB
ALBUMIN SERPL BCP-MCNC: 3.7 G/DL (ref 3.5–5.2)
ALP SERPL-CCNC: 74 U/L (ref 55–135)
ALT SERPL W/O P-5'-P-CCNC: 27 U/L (ref 10–44)
ANION GAP SERPL CALC-SCNC: 8 MMOL/L (ref 8–16)
AST SERPL-CCNC: 25 U/L (ref 10–40)
BASOPHILS # BLD AUTO: 0.04 K/UL (ref 0–0.2)
BASOPHILS NFR BLD: 0.6 % (ref 0–1.9)
BILIRUB SERPL-MCNC: 0.4 MG/DL (ref 0.1–1)
BUN SERPL-MCNC: 10 MG/DL (ref 6–20)
CALCIUM SERPL-MCNC: 9 MG/DL (ref 8.7–10.5)
CHLORIDE SERPL-SCNC: 103 MMOL/L (ref 95–110)
CHOLEST SERPL-MCNC: 209 MG/DL (ref 120–199)
CHOLEST/HDLC SERPL: 4.6 {RATIO} (ref 2–5)
CO2 SERPL-SCNC: 26 MMOL/L (ref 23–29)
CREAT SERPL-MCNC: 1.1 MG/DL (ref 0.5–1.4)
DIFFERENTIAL METHOD: ABNORMAL
EOSINOPHIL # BLD AUTO: 0.4 K/UL (ref 0–0.5)
EOSINOPHIL NFR BLD: 5.6 % (ref 0–8)
ERYTHROCYTE [DISTWIDTH] IN BLOOD BY AUTOMATED COUNT: 13.3 % (ref 11.5–14.5)
EST. GFR  (AFRICAN AMERICAN): >60 ML/MIN/1.73 M^2
EST. GFR  (NON AFRICAN AMERICAN): >60 ML/MIN/1.73 M^2
GLUCOSE SERPL-MCNC: 116 MG/DL (ref 70–110)
HCT VFR BLD AUTO: 42.3 % (ref 37–48.5)
HDLC SERPL-MCNC: 45 MG/DL (ref 40–75)
HDLC SERPL: 21.5 % (ref 20–50)
HGB BLD-MCNC: 13.2 G/DL (ref 12–16)
IMM GRANULOCYTES # BLD AUTO: 0.01 K/UL (ref 0–0.04)
IMM GRANULOCYTES NFR BLD AUTO: 0.2 % (ref 0–0.5)
LDLC SERPL CALC-MCNC: 121.6 MG/DL (ref 63–159)
LYMPHOCYTES # BLD AUTO: 2.8 K/UL (ref 1–4.8)
LYMPHOCYTES NFR BLD: 41.7 % (ref 18–48)
MCH RBC QN AUTO: 30.3 PG (ref 27–31)
MCHC RBC AUTO-ENTMCNC: 31.2 G/DL (ref 32–36)
MCV RBC AUTO: 97 FL (ref 82–98)
MONOCYTES # BLD AUTO: 0.3 K/UL (ref 0.3–1)
MONOCYTES NFR BLD: 4.5 % (ref 4–15)
NEUTROPHILS # BLD AUTO: 3.1 K/UL (ref 1.8–7.7)
NEUTROPHILS NFR BLD: 47.4 % (ref 38–73)
NONHDLC SERPL-MCNC: 164 MG/DL
NRBC BLD-RTO: 0 /100 WBC
PLATELET # BLD AUTO: 269 K/UL (ref 150–350)
PMV BLD AUTO: 10.2 FL (ref 9.2–12.9)
POTASSIUM SERPL-SCNC: 4.3 MMOL/L (ref 3.5–5.1)
PROT SERPL-MCNC: 7.6 G/DL (ref 6–8.4)
RBC # BLD AUTO: 4.36 M/UL (ref 4–5.4)
SODIUM SERPL-SCNC: 137 MMOL/L (ref 136–145)
T4 FREE SERPL-MCNC: 1.05 NG/DL (ref 0.71–1.51)
TRIGL SERPL-MCNC: 212 MG/DL (ref 30–150)
TSH SERPL DL<=0.005 MIU/L-ACNC: 1.06 UIU/ML (ref 0.4–4)
WBC # BLD AUTO: 6.6 K/UL (ref 3.9–12.7)

## 2020-06-18 PROCEDURE — 85025 COMPLETE CBC W/AUTO DIFF WBC: CPT

## 2020-06-18 PROCEDURE — 84439 ASSAY OF FREE THYROXINE: CPT

## 2020-06-18 PROCEDURE — 82306 VITAMIN D 25 HYDROXY: CPT

## 2020-06-18 PROCEDURE — 80053 COMPREHEN METABOLIC PANEL: CPT

## 2020-06-18 PROCEDURE — 84443 ASSAY THYROID STIM HORMONE: CPT

## 2020-06-18 PROCEDURE — 83036 HEMOGLOBIN GLYCOSYLATED A1C: CPT

## 2020-06-18 PROCEDURE — 99999 PR PBB SHADOW E&M-EST. PATIENT-LVL III: CPT | Mod: PBBFAC,,, | Performed by: FAMILY MEDICINE

## 2020-06-18 PROCEDURE — 80061 LIPID PANEL: CPT

## 2020-06-18 PROCEDURE — 99999 PR PBB SHADOW E&M-EST. PATIENT-LVL III: ICD-10-PCS | Mod: PBBFAC,,, | Performed by: FAMILY MEDICINE

## 2020-06-18 PROCEDURE — 99395 PREV VISIT EST AGE 18-39: CPT | Mod: S$GLB,,, | Performed by: FAMILY MEDICINE

## 2020-06-18 PROCEDURE — 99395 PR PREVENTIVE VISIT,EST,18-39: ICD-10-PCS | Mod: S$GLB,,, | Performed by: FAMILY MEDICINE

## 2020-06-18 PROCEDURE — 36415 COLL VENOUS BLD VENIPUNCTURE: CPT | Mod: PO

## 2020-06-18 NOTE — PROGRESS NOTES
"Chief Complaint   Patient presents with    Annual Exam       Lizbeth Nguyen is a 40 y.o. female who presents per the Chief Complaint.  Pt is known to me and was last seen by me on 8/8/2018.  All known chronic medical issues have been documented.    HPI    ROS  Review of Systems   Constitutional: Positive for activity change and unexpected weight change.   HENT: Negative for hearing loss, rhinorrhea and trouble swallowing.    Eyes: Negative for discharge and visual disturbance.   Respiratory: Negative for chest tightness and wheezing.    Cardiovascular: Negative for chest pain and palpitations.   Gastrointestinal: Positive for abdominal pain. Negative for abdominal distention, anal bleeding, blood in stool, constipation, diarrhea, nausea, rectal pain and vomiting.   Endocrine: Negative for polydipsia and polyuria.   Genitourinary: Negative for difficulty urinating, dysuria, hematuria and menstrual problem.   Musculoskeletal: Positive for arthralgias and joint swelling. Negative for neck pain.   Neurological: Negative for weakness and headaches.   Psychiatric/Behavioral: Positive for dysphoric mood. Negative for confusion.       Physical Exam  Vitals:    06/18/20 1357   BP: 138/86   Pulse: 92   Resp: 20   Temp: 99 °F (37.2 °C)    Body mass index is 40.6 kg/m².  Weight: 100.7 kg (222 lb 0.1 oz)   Height: 5' 2" (157.5 cm)     Physical Exam  Vitals signs reviewed.   Constitutional:       General: She is not in acute distress.     Appearance: Normal appearance. She is well-developed. She is not ill-appearing, toxic-appearing or diaphoretic.   HENT:      Head: Normocephalic and atraumatic.      Right Ear: Hearing, tympanic membrane, ear canal and external ear normal. No decreased hearing noted. No tenderness. No foreign body. Tympanic membrane is not injected, scarred, perforated, erythematous, retracted or bulging.      Left Ear: Hearing, tympanic membrane, ear canal and external ear normal. No decreased hearing " noted. No tenderness. No foreign body. Tympanic membrane is not injected, scarred, perforated, erythematous, retracted or bulging.      Nose: Nose normal. No nasal deformity or rhinorrhea.      Mouth/Throat:      Dentition: Does not have dentures. No dental caries.      Pharynx: Uvula midline.   Eyes:      General: Lids are normal. No scleral icterus.        Right eye: No foreign body or discharge.         Left eye: No foreign body or discharge.      Conjunctiva/sclera: Conjunctivae normal.      Right eye: No chemosis or exudate.     Left eye: No chemosis or exudate.     Pupils: Pupils are equal, round, and reactive to light.   Neck:      Musculoskeletal: Full passive range of motion without pain, normal range of motion and neck supple.      Thyroid: No thyroid mass or thyromegaly.   Cardiovascular:      Rate and Rhythm: Normal rate and regular rhythm.      Heart sounds: Normal heart sounds, S1 normal and S2 normal. No murmur. No friction rub. No gallop.    Pulmonary:      Effort: Pulmonary effort is normal.      Breath sounds: No decreased breath sounds, wheezing, rhonchi or rales.   Chest:      Chest wall: No mass, deformity or tenderness.   Abdominal:      General: Bowel sounds are normal. There is no distension.      Palpations: Abdomen is soft. Abdomen is not rigid. There is no mass.      Tenderness: There is abdominal tenderness in the epigastric area. There is no guarding or rebound.       Musculoskeletal: Normal range of motion.   Lymphadenopathy:      Head:      Right side of head: No submental, submandibular, tonsillar, preauricular or posterior auricular adenopathy.      Left side of head: No submental, submandibular, tonsillar, preauricular or posterior auricular adenopathy.      Cervical: No cervical adenopathy.   Skin:     General: Skin is warm and dry.      Coloration: Skin is not pale.      Findings: No rash.   Neurological:      Mental Status: She is alert and oriented to person, place, and time.       Cranial Nerves: No cranial nerve deficit.      Sensory: No sensory deficit.      Motor: No abnormal muscle tone or seizure activity.   Psychiatric:         Attention and Perception: She is attentive.         Speech: Speech normal.         Behavior: Behavior normal. Behavior is cooperative.         Thought Content: Thought content normal.         Judgment: Judgment normal.         Assessment & Plan    Discussion of plan of care including treatment options regarding health and wellness were reviewed and discussed with patient.  Any changes to medication or treatment plan, as well as any screening blood test, imaging, or referrals to specialist, are documented.  Follow up as indicated.     1. Well adult exam  Discussed age and gender appropriate screenings at this visit and encouraged a healthy diet with low saturated fats, and increased physical activity.  Counseled on medically appropriate vaccines based on age and current health condition.  Screening test will be ordered and once completed, patient will be notified of results when available.  If necessary, will follow up to discuss and manage further.   - CBC auto differential; Future  - Comprehensive metabolic panel; Future  - Vitamin D; Future  - TSH; Future  - T4, free; Future  - Hemoglobin A1C; Future  - Lipid Panel; Future    2. Right upper quadrant abdominal pain  May be gas; recommended OTC therapy.  Will consider gallbladder based on clinical presentation and history if not improved with therapy.     3. Severe obesity (BMI >= 40)  We discussed weight and safe, effective ways of losing pounds, including low carbohydrate, low fat diet and increasing physical activity to improve cardiovascular performance and increase metabolism.  Patient was encouraged to set realistic attainable goals for weight loss, and we will follow up periodically.        Follow up in about 6 months (around 12/18/2020), or if symptoms worsen or fail to improve.      ACTIVE MEDICAL  ISSUES:  Documented in Problem List    PAST MEDICAL HISTORY  Documented  .  PAST SURGICAL HISTORY:  Documented    SOCIAL HISTORY:  Documented    FAMILY HISTORY:  Documented    ALLERGIES AND MEDICATIONS: updated and reviewed.  Documented    Health Maintenance       Date Due Completion Date    HIV Screening 02/28/1996 ---    Influenza Vaccine (Season Ended) 09/01/2020 10/30/2018    Cervical Cancer Screening 09/28/2020 9/28/2017    Override on 8/14/2013: Done    TETANUS VACCINE 10/14/2027 10/14/2017

## 2020-06-19 LAB
25(OH)D3+25(OH)D2 SERPL-MCNC: 21 NG/ML (ref 30–96)
ESTIMATED AVG GLUCOSE: 105 MG/DL (ref 68–131)
HBA1C MFR BLD HPLC: 5.3 % (ref 4–5.6)

## 2020-06-29 ENCOUNTER — OFFICE VISIT (OUTPATIENT)
Dept: FAMILY MEDICINE | Facility: CLINIC | Age: 40
End: 2020-06-29
Payer: COMMERCIAL

## 2020-06-29 DIAGNOSIS — J02.9 PHARYNGITIS, UNSPECIFIED ETIOLOGY: Primary | ICD-10-CM

## 2020-06-29 PROCEDURE — 99213 PR OFFICE/OUTPT VISIT, EST, LEVL III, 20-29 MIN: ICD-10-PCS | Mod: 95,,, | Performed by: NURSE PRACTITIONER

## 2020-06-29 PROCEDURE — 99213 OFFICE O/P EST LOW 20 MIN: CPT | Mod: 95,,, | Performed by: NURSE PRACTITIONER

## 2020-06-29 RX ORDER — AZITHROMYCIN 250 MG/1
TABLET, FILM COATED ORAL
Qty: 6 TABLET | Refills: 0 | Status: SHIPPED | OUTPATIENT
Start: 2020-06-29 | End: 2020-07-04

## 2020-06-29 NOTE — PROGRESS NOTES
Subjective:       Patient ID: Lizbeth Nguyen is a 40 y.o. female.    Chief Complaint: No chief complaint on file.    Sore Throat   This is a new problem. The current episode started yesterday. The problem has been gradually worsening. The pain is worse on the right side. There has been no fever. The pain is at a severity of 1/10. The pain is mild. Associated symptoms include swollen glands. Pertinent negatives include no abdominal pain, congestion, coughing, diarrhea, drooling, ear discharge, ear pain, headaches, hoarse voice, plugged ear sensation, neck pain, shortness of breath, stridor, trouble swallowing or vomiting. Associated symptoms comments: Rhinorrhea; denies cough, SOB, BA, fever. She has had no exposure to strep or mono. She has tried gargles for the symptoms. The treatment provided mild relief.   No past medical history on file.  Social History     Socioeconomic History    Marital status: Single     Spouse name: Not on file    Number of children: Not on file    Years of education: Not on file    Highest education level: Not on file   Occupational History    Not on file   Social Needs    Financial resource strain: Not hard at all    Food insecurity     Worry: Never true     Inability: Never true    Transportation needs     Medical: No     Non-medical: No   Tobacco Use    Smoking status: Never Smoker    Smokeless tobacco: Never Used   Substance and Sexual Activity    Alcohol use: Yes     Frequency: 2-4 times a month     Drinks per session: 1 or 2     Binge frequency: Less than monthly    Drug use: No    Sexual activity: Not on file   Lifestyle    Physical activity     Days per week: 0 days     Minutes per session: 0 min    Stress: To some extent   Relationships    Social connections     Talks on phone: More than three times a week     Gets together: Once a week     Attends Orthodox service: Not on file     Active member of club or organization: Yes     Attends meetings of clubs or  organizations: More than 4 times per year     Relationship status: Never    Other Topics Concern    Not on file   Social History Narrative    Not on file     No past surgical history on file.    Review of Systems   Constitutional: Negative.    HENT: Positive for sore throat. Negative for nasal congestion, drooling, ear discharge, ear pain, hoarse voice and trouble swallowing.    Eyes: Negative.    Respiratory: Negative.  Negative for cough, shortness of breath and stridor.    Cardiovascular: Negative.    Gastrointestinal: Negative.  Negative for abdominal pain, diarrhea and vomiting.   Endocrine: Negative.    Genitourinary: Negative.    Musculoskeletal: Negative.  Negative for neck pain.   Integumentary:  Negative.   Allergic/Immunologic: Negative.    Neurological: Negative.  Negative for headaches.   Psychiatric/Behavioral: Negative.          Objective:      Physical Exam  Constitutional:       Appearance: Normal appearance.   Neurological:      Mental Status: She is alert.         Assessment:       1. Pharyngitis, unspecified etiology        Plan:           Diagnoses and all orders for this visit:    Pharyngitis, unspecified etiology  -     azithromycin (Z-FAVIOLA) 250 MG tablet; Take 2 tablets by mouth on day 1; Take 1 tablet by mouth on days 2-5  Xyzal, flonase as prescribed  Recommend f/u with PCP immediately if symptoms worsen/for COVID-19 testing  Report to ER immediately if symptoms worsen

## 2020-06-29 NOTE — PATIENT INSTRUCTIONS
Hydrate well  Rest  Tylenol OTC as directed  Warm salt water gargles PRN  F/U with PCP   Report to ER immediately if symptoms worsen

## 2020-08-14 DIAGNOSIS — Z11.59 NEED FOR HEPATITIS C SCREENING TEST: ICD-10-CM

## 2020-10-28 ENCOUNTER — PATIENT OUTREACH (OUTPATIENT)
Dept: ADMINISTRATIVE | Facility: HOSPITAL | Age: 40
End: 2020-10-28

## 2021-03-04 ENCOUNTER — OFFICE VISIT (OUTPATIENT)
Dept: FAMILY MEDICINE | Facility: CLINIC | Age: 41
End: 2021-03-04
Payer: COMMERCIAL

## 2021-03-04 DIAGNOSIS — T78.40XA ALLERGIC REACTION, INITIAL ENCOUNTER: Primary | ICD-10-CM

## 2021-03-04 PROCEDURE — 99214 OFFICE O/P EST MOD 30 MIN: CPT | Mod: 95,,, | Performed by: FAMILY MEDICINE

## 2021-03-04 PROCEDURE — 99214 PR OFFICE/OUTPT VISIT, EST, LEVL IV, 30-39 MIN: ICD-10-PCS | Mod: 95,,, | Performed by: FAMILY MEDICINE

## 2021-03-04 RX ORDER — METHYLPREDNISOLONE 4 MG/1
TABLET ORAL
Qty: 1 PACKAGE | Refills: 0 | Status: SHIPPED | OUTPATIENT
Start: 2021-03-04 | End: 2021-03-25

## 2021-03-10 DIAGNOSIS — Z12.31 OTHER SCREENING MAMMOGRAM: ICD-10-CM

## 2021-03-23 ENCOUNTER — HOSPITAL ENCOUNTER (OUTPATIENT)
Dept: RADIOLOGY | Facility: HOSPITAL | Age: 41
Discharge: HOME | End: 2021-03-23
Attending: FAMILY MEDICINE
Payer: COMMERCIAL

## 2021-03-23 VITALS — HEIGHT: 62 IN | BODY MASS INDEX: 40.85 KG/M2 | WEIGHT: 222 LBS

## 2021-03-23 DIAGNOSIS — Z12.31 OTHER SCREENING MAMMOGRAM: ICD-10-CM

## 2021-03-23 PROCEDURE — 77067 SCR MAMMO BI INCL CAD: CPT | Mod: TC

## 2021-03-23 PROCEDURE — 77063 BREAST TOMOSYNTHESIS BI: CPT | Mod: 26,,, | Performed by: RADIOLOGY

## 2021-03-23 PROCEDURE — 77067 SCR MAMMO BI INCL CAD: CPT | Mod: 26,,, | Performed by: RADIOLOGY

## 2021-03-23 PROCEDURE — 77063 MAMMO DIGITAL SCREENING BILAT WITH TOMO: ICD-10-PCS | Mod: 26,,, | Performed by: RADIOLOGY

## 2021-03-23 PROCEDURE — 77067 MAMMO DIGITAL SCREENING BILAT WITH TOMO: ICD-10-PCS | Mod: 26,,, | Performed by: RADIOLOGY

## 2021-04-16 ENCOUNTER — HOSPITAL ENCOUNTER (EMERGENCY)
Facility: HOSPITAL | Age: 41
Discharge: HOME | End: 2021-04-16
Attending: EMERGENCY MEDICINE
Payer: COMMERCIAL

## 2021-04-16 ENCOUNTER — TELEPHONE (OUTPATIENT)
Dept: FAMILY MEDICINE | Facility: CLINIC | Age: 41
End: 2021-04-16

## 2021-04-16 VITALS
TEMPERATURE: 98 F | RESPIRATION RATE: 18 BRPM | WEIGHT: 222 LBS | DIASTOLIC BLOOD PRESSURE: 100 MMHG | OXYGEN SATURATION: 97 % | HEART RATE: 103 BPM | HEIGHT: 62 IN | BODY MASS INDEX: 40.85 KG/M2 | SYSTOLIC BLOOD PRESSURE: 149 MMHG

## 2021-04-16 DIAGNOSIS — J06.9 VIRAL URI WITH COUGH: Primary | ICD-10-CM

## 2021-04-16 PROCEDURE — U0003 INFECTIOUS AGENT DETECTION BY NUCLEIC ACID (DNA OR RNA); SEVERE ACUTE RESPIRATORY SYNDROME CORONAVIRUS 2 (SARS-COV-2) (CORONAVIRUS DISEASE [COVID-19]), AMPLIFIED PROBE TECHNIQUE, MAKING USE OF HIGH THROUGHPUT TECHNOLOGIES AS DESCRIBED BY CMS-2020-01-R: HCPCS | Performed by: EMERGENCY MEDICINE

## 2021-04-16 PROCEDURE — 99284 EMERGENCY DEPT VISIT MOD MDM: CPT | Mod: ER

## 2021-04-16 PROCEDURE — U0005 INFEC AGEN DETEC AMPLI PROBE: HCPCS | Performed by: EMERGENCY MEDICINE

## 2021-04-16 RX ORDER — CETIRIZINE HYDROCHLORIDE 10 MG/1
10 TABLET ORAL DAILY
Qty: 30 TABLET | Refills: 0 | Status: SHIPPED | OUTPATIENT
Start: 2021-04-16 | End: 2022-04-16

## 2021-04-16 RX ORDER — GUAIFENESIN 100 MG/5ML
100-200 SOLUTION ORAL EVERY 4 HOURS PRN
Qty: 60 ML | Refills: 0 | Status: SHIPPED | OUTPATIENT
Start: 2021-04-16 | End: 2021-04-26

## 2021-04-16 RX ORDER — FLUTICASONE PROPIONATE 50 MCG
1 SPRAY, SUSPENSION (ML) NASAL 2 TIMES DAILY PRN
Qty: 15 G | Refills: 0 | Status: SHIPPED | OUTPATIENT
Start: 2021-04-16

## 2021-04-16 NOTE — TELEPHONE ENCOUNTER
Patient sent [facility] message regarding getting tested for covid with sx of Itchy throat runny nose headache.

## 2021-04-17 LAB — SARS-COV-2 RNA RESP QL NAA+PROBE: NOT DETECTED

## 2021-10-15 ENCOUNTER — IMMUNIZATION (OUTPATIENT)
Dept: FAMILY MEDICINE | Facility: CLINIC | Age: 41
End: 2021-10-15
Payer: COMMERCIAL

## 2021-10-15 DIAGNOSIS — Z23 FLU VACCINE NEED: Primary | ICD-10-CM

## 2021-10-15 PROCEDURE — 90471 IMMUNIZATION ADMIN: CPT | Mod: S$GLB,,, | Performed by: FAMILY MEDICINE

## 2021-10-15 PROCEDURE — 90686 IIV4 VACC NO PRSV 0.5 ML IM: CPT | Mod: S$GLB,,, | Performed by: FAMILY MEDICINE

## 2021-10-15 PROCEDURE — 90686 FLU VACCINE (QUAD) GREATER THAN OR EQUAL TO 3YO PRESERVATIVE FREE IM: ICD-10-PCS | Mod: S$GLB,,, | Performed by: FAMILY MEDICINE

## 2021-10-15 PROCEDURE — 90471 FLU VACCINE (QUAD) GREATER THAN OR EQUAL TO 3YO PRESERVATIVE FREE IM: ICD-10-PCS | Mod: S$GLB,,, | Performed by: FAMILY MEDICINE

## 2022-05-11 DIAGNOSIS — Z12.31 OTHER SCREENING MAMMOGRAM: ICD-10-CM

## 2022-05-30 ENCOUNTER — PATIENT MESSAGE (OUTPATIENT)
Dept: ADMINISTRATIVE | Facility: HOSPITAL | Age: 42
End: 2022-05-30
Payer: COMMERCIAL

## 2022-07-15 ENCOUNTER — OFFICE VISIT (OUTPATIENT)
Dept: FAMILY MEDICINE | Facility: CLINIC | Age: 42
End: 2022-07-15
Payer: COMMERCIAL

## 2022-07-15 DIAGNOSIS — T14.8XXA TRAUMATIC MYALGIA: Primary | ICD-10-CM

## 2022-07-15 PROCEDURE — 1160F RVW MEDS BY RX/DR IN RCRD: CPT | Mod: CPTII,95,, | Performed by: NURSE PRACTITIONER

## 2022-07-15 PROCEDURE — 1160F PR REVIEW ALL MEDS BY PRESCRIBER/CLIN PHARMACIST DOCUMENTED: ICD-10-PCS | Mod: CPTII,95,, | Performed by: NURSE PRACTITIONER

## 2022-07-15 PROCEDURE — 1159F PR MEDICATION LIST DOCUMENTED IN MEDICAL RECORD: ICD-10-PCS | Mod: CPTII,95,, | Performed by: NURSE PRACTITIONER

## 2022-07-15 PROCEDURE — 99214 OFFICE O/P EST MOD 30 MIN: CPT | Mod: 95,,, | Performed by: NURSE PRACTITIONER

## 2022-07-15 PROCEDURE — 1159F MED LIST DOCD IN RCRD: CPT | Mod: CPTII,95,, | Performed by: NURSE PRACTITIONER

## 2022-07-15 PROCEDURE — 99214 PR OFFICE/OUTPT VISIT, EST, LEVL IV, 30-39 MIN: ICD-10-PCS | Mod: 95,,, | Performed by: NURSE PRACTITIONER

## 2022-07-15 RX ORDER — GABAPENTIN 300 MG/1
300 CAPSULE ORAL EVERY 8 HOURS PRN
Qty: 90 CAPSULE | Refills: 0 | Status: SHIPPED | OUTPATIENT
Start: 2022-07-15 | End: 2023-07-15

## 2022-07-15 RX ORDER — NAPROXEN SODIUM 220 MG/1
81 TABLET, FILM COATED ORAL
COMMUNITY
Start: 2022-07-14 | End: 2022-08-13

## 2022-07-15 RX ORDER — METHOCARBAMOL 750 MG/1
750 TABLET, FILM COATED ORAL
Qty: 90 TABLET | Refills: 0 | Status: SHIPPED | OUTPATIENT
Start: 2022-07-15
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
## 2022-08-05 ENCOUNTER — PATIENT OUTREACH (OUTPATIENT)
Dept: FAMILY MEDICINE | Facility: CLINIC | Age: 42
End: 2022-08-05
Payer: COMMERCIAL

## 2022-08-05 ENCOUNTER — TELEPHONE (OUTPATIENT)
Dept: FAMILY MEDICINE | Facility: CLINIC | Age: 42
End: 2022-08-05
Payer: COMMERCIAL

## 2022-10-13 ENCOUNTER — TELEPHONE (OUTPATIENT)
Dept: FAMILY MEDICINE | Facility: CLINIC | Age: 42
End: 2022-10-13

## 2022-10-13 ENCOUNTER — CLINICAL SUPPORT (OUTPATIENT)
Dept: FAMILY MEDICINE | Facility: CLINIC | Age: 42
End: 2022-10-13
Payer: COMMERCIAL

## 2022-10-13 DIAGNOSIS — Z23 FLU VACCINE NEED: Primary | ICD-10-CM

## 2022-10-13 PROCEDURE — 90471 IMMUNIZATION ADMIN: CPT | Mod: S$GLB,,, | Performed by: FAMILY MEDICINE

## 2022-10-13 PROCEDURE — 99999 PR PBB SHADOW E&M-EST. PATIENT-LVL II: ICD-10-PCS | Mod: PBBFAC,,,

## 2022-10-13 PROCEDURE — 90686 IIV4 VACC NO PRSV 0.5 ML IM: CPT | Mod: S$GLB,,, | Performed by: FAMILY MEDICINE

## 2022-10-13 PROCEDURE — 99999 PR PBB SHADOW E&M-EST. PATIENT-LVL II: CPT | Mod: PBBFAC,,,

## 2022-10-13 PROCEDURE — 90686 FLU VACCINE (QUAD) GREATER THAN OR EQUAL TO 3YO PRESERVATIVE FREE IM: ICD-10-PCS | Mod: S$GLB,,, | Performed by: FAMILY MEDICINE

## 2022-10-13 PROCEDURE — 90471 FLU VACCINE (QUAD) GREATER THAN OR EQUAL TO 3YO PRESERVATIVE FREE IM: ICD-10-PCS | Mod: S$GLB,,, | Performed by: FAMILY MEDICINE

## 2022-10-26 ENCOUNTER — HOSPITAL ENCOUNTER (OUTPATIENT)
Dept: RADIOLOGY | Facility: HOSPITAL | Age: 42
Discharge: HOME | End: 2022-10-26
Attending: FAMILY MEDICINE
Payer: COMMERCIAL

## 2022-10-26 VITALS — WEIGHT: 222 LBS | HEIGHT: 62 IN | BODY MASS INDEX: 40.85 KG/M2

## 2022-10-26 DIAGNOSIS — Z12.31 OTHER SCREENING MAMMOGRAM: ICD-10-CM

## 2022-10-26 PROCEDURE — 77067 MAMMO DIGITAL SCREENING BILAT WITH TOMO: ICD-10-PCS | Mod: 26,,, | Performed by: RADIOLOGY

## 2022-10-26 PROCEDURE — 77063 MAMMO DIGITAL SCREENING BILAT WITH TOMO: ICD-10-PCS | Mod: 26,,, | Performed by: RADIOLOGY

## 2022-10-26 PROCEDURE — 77063 BREAST TOMOSYNTHESIS BI: CPT | Mod: 26,,, | Performed by: RADIOLOGY

## 2022-10-26 PROCEDURE — 77063 BREAST TOMOSYNTHESIS BI: CPT | Mod: TC

## 2022-10-26 PROCEDURE — 77067 SCR MAMMO BI INCL CAD: CPT | Mod: 26,,, | Performed by: RADIOLOGY

## 2023-01-09 ENCOUNTER — PATIENT OUTREACH (OUTPATIENT)
Dept: ADMINISTRATIVE | Facility: HOSPITAL | Age: 43
End: 2023-01-09
Payer: COMMERCIAL